# Patient Record
Sex: MALE | Race: BLACK OR AFRICAN AMERICAN | NOT HISPANIC OR LATINO | Employment: FULL TIME | URBAN - METROPOLITAN AREA
[De-identification: names, ages, dates, MRNs, and addresses within clinical notes are randomized per-mention and may not be internally consistent; named-entity substitution may affect disease eponyms.]

---

## 2019-12-05 ENCOUNTER — HOSPITAL ENCOUNTER (EMERGENCY)
Facility: HOSPITAL | Age: 49
Discharge: HOME | End: 2019-12-05
Attending: EMERGENCY MEDICINE
Payer: MEDICAID

## 2019-12-05 VITALS
OXYGEN SATURATION: 98 % | DIASTOLIC BLOOD PRESSURE: 82 MMHG | TEMPERATURE: 99 F | HEART RATE: 80 BPM | RESPIRATION RATE: 18 BRPM | SYSTOLIC BLOOD PRESSURE: 143 MMHG | BODY MASS INDEX: 27.8 KG/M2 | WEIGHT: 216.5 LBS

## 2019-12-05 DIAGNOSIS — R10.9 LEFT FLANK PAIN: Primary | ICD-10-CM

## 2019-12-05 DIAGNOSIS — N30.01 ACUTE CYSTITIS WITH HEMATURIA: ICD-10-CM

## 2019-12-05 LAB
ALBUMIN SERPL BCP-MCNC: 3.9 G/DL (ref 3.5–5.2)
ALP SERPL-CCNC: 89 U/L (ref 55–135)
ALT SERPL W/O P-5'-P-CCNC: 36 U/L (ref 10–44)
ANION GAP SERPL CALC-SCNC: 10 MMOL/L (ref 8–16)
AST SERPL-CCNC: 21 U/L (ref 10–40)
BACTERIA #/AREA URNS AUTO: ABNORMAL /HPF
BASOPHILS # BLD AUTO: 0.02 K/UL (ref 0–0.2)
BASOPHILS NFR BLD: 0.1 % (ref 0–1.9)
BILIRUB SERPL-MCNC: 0.5 MG/DL (ref 0.1–1)
BILIRUB UR QL STRIP: NEGATIVE
BUN SERPL-MCNC: 20 MG/DL (ref 6–20)
CALCIUM SERPL-MCNC: 9.6 MG/DL (ref 8.7–10.5)
CHLORIDE SERPL-SCNC: 103 MMOL/L (ref 95–110)
CLARITY UR REFRACT.AUTO: ABNORMAL
CO2 SERPL-SCNC: 25 MMOL/L (ref 23–29)
COLOR UR AUTO: YELLOW
CREAT SERPL-MCNC: 1.6 MG/DL (ref 0.5–1.4)
DIFFERENTIAL METHOD: ABNORMAL
EOSINOPHIL # BLD AUTO: 0 K/UL (ref 0–0.5)
EOSINOPHIL NFR BLD: 0.2 % (ref 0–8)
ERYTHROCYTE [DISTWIDTH] IN BLOOD BY AUTOMATED COUNT: 14 % (ref 11.5–14.5)
EST. GFR  (AFRICAN AMERICAN): 56.8 ML/MIN/1.73 M^2
EST. GFR  (NON AFRICAN AMERICAN): 49.2 ML/MIN/1.73 M^2
GLUCOSE SERPL-MCNC: 136 MG/DL (ref 70–110)
GLUCOSE UR QL STRIP: NEGATIVE
HCT VFR BLD AUTO: 42 % (ref 40–54)
HGB BLD-MCNC: 13.3 G/DL (ref 14–18)
HGB UR QL STRIP: ABNORMAL
HYALINE CASTS UR QL AUTO: 0 /LPF
IMM GRANULOCYTES # BLD AUTO: 0.06 K/UL (ref 0–0.04)
IMM GRANULOCYTES NFR BLD AUTO: 0.4 % (ref 0–0.5)
KETONES UR QL STRIP: NEGATIVE
LEUKOCYTE ESTERASE UR QL STRIP: ABNORMAL
LYMPHOCYTES # BLD AUTO: 1.9 K/UL (ref 1–4.8)
LYMPHOCYTES NFR BLD: 11.9 % (ref 18–48)
MCH RBC QN AUTO: 26.9 PG (ref 27–31)
MCHC RBC AUTO-ENTMCNC: 31.7 G/DL (ref 32–36)
MCV RBC AUTO: 85 FL (ref 82–98)
MICROSCOPIC COMMENT: ABNORMAL
MONOCYTES # BLD AUTO: 1.4 K/UL (ref 0.3–1)
MONOCYTES NFR BLD: 8.8 % (ref 4–15)
NEUTROPHILS # BLD AUTO: 12.6 K/UL (ref 1.8–7.7)
NEUTROPHILS NFR BLD: 78.6 % (ref 38–73)
NITRITE UR QL STRIP: NEGATIVE
NRBC BLD-RTO: 0 /100 WBC
PH UR STRIP: 6 [PH] (ref 5–8)
PLATELET # BLD AUTO: 208 K/UL (ref 150–350)
PMV BLD AUTO: 10.4 FL (ref 9.2–12.9)
POTASSIUM SERPL-SCNC: 3.8 MMOL/L (ref 3.5–5.1)
PROT SERPL-MCNC: 7.4 G/DL (ref 6–8.4)
PROT UR QL STRIP: ABNORMAL
RBC # BLD AUTO: 4.95 M/UL (ref 4.6–6.2)
RBC #/AREA URNS AUTO: >100 /HPF (ref 0–4)
SODIUM SERPL-SCNC: 138 MMOL/L (ref 136–145)
SP GR UR STRIP: 1.02 (ref 1–1.03)
SQUAMOUS #/AREA URNS AUTO: 4 /HPF
URN SPEC COLLECT METH UR: ABNORMAL
UROBILINOGEN UR STRIP-ACNC: NEGATIVE EU/DL
WBC # BLD AUTO: 16.02 K/UL (ref 3.9–12.7)
WBC #/AREA URNS AUTO: >100 /HPF (ref 0–5)

## 2019-12-05 PROCEDURE — 99284 EMERGENCY DEPT VISIT MOD MDM: CPT | Mod: 25,ER

## 2019-12-05 PROCEDURE — 87077 CULTURE AEROBIC IDENTIFY: CPT

## 2019-12-05 PROCEDURE — 87186 SC STD MICRODIL/AGAR DIL: CPT

## 2019-12-05 PROCEDURE — 96374 THER/PROPH/DIAG INJ IV PUSH: CPT | Mod: ER

## 2019-12-05 PROCEDURE — 81000 URINALYSIS NONAUTO W/SCOPE: CPT | Mod: ER

## 2019-12-05 PROCEDURE — 87086 URINE CULTURE/COLONY COUNT: CPT

## 2019-12-05 PROCEDURE — 63600175 PHARM REV CODE 636 W HCPCS: Mod: ER | Performed by: EMERGENCY MEDICINE

## 2019-12-05 PROCEDURE — 85025 COMPLETE CBC W/AUTO DIFF WBC: CPT | Mod: ER

## 2019-12-05 PROCEDURE — 87088 URINE BACTERIA CULTURE: CPT

## 2019-12-05 PROCEDURE — 80053 COMPREHEN METABOLIC PANEL: CPT | Mod: ER

## 2019-12-05 RX ORDER — CIPROFLOXACIN 500 MG/1
500 TABLET ORAL 2 TIMES DAILY
Qty: 14 TABLET | Refills: 0 | Status: SHIPPED | OUTPATIENT
Start: 2019-12-05 | End: 2019-12-12

## 2019-12-05 RX ORDER — KETOROLAC TROMETHAMINE 30 MG/ML
15 INJECTION, SOLUTION INTRAMUSCULAR; INTRAVENOUS
Status: COMPLETED | OUTPATIENT
Start: 2019-12-05 | End: 2019-12-05

## 2019-12-05 RX ADMIN — KETOROLAC TROMETHAMINE 15 MG: 30 INJECTION, SOLUTION INTRAMUSCULAR; INTRAVENOUS at 10:12

## 2019-12-05 NOTE — ED PROVIDER NOTES
Encounter Date: 12/5/2019       History     Chief Complaint   Patient presents with    Flank Pain     left flank pain intermittent, brown urine today     The history is provided by the patient.   Flank Pain   This is a new problem. The current episode started yesterday. The problem occurs constantly. The problem has not changed since onset.Pertinent negatives include no chest pain, no abdominal pain, no headaches and no shortness of breath. Nothing aggravates the symptoms. Nothing relieves the symptoms.     Review of patient's allergies indicates:  No Known Allergies  Past Medical History:   Diagnosis Date    Diabetes mellitus     High cholesterol     Kidney stones      Past Surgical History:   Procedure Laterality Date    NO PAST SURGERIES       Family History   Problem Relation Age of Onset    Diabetes Mother     Cancer Father      Social History     Tobacco Use    Smoking status: Never Smoker    Smokeless tobacco: Never Used   Substance Use Topics    Alcohol use: No    Drug use: No     Review of Systems   Constitutional: Negative for fever.   HENT: Negative for sore throat.    Respiratory: Negative for shortness of breath.    Cardiovascular: Negative for chest pain.   Gastrointestinal: Negative for abdominal pain and nausea.   Genitourinary: Positive for flank pain. Negative for dysuria.   Musculoskeletal: Negative for back pain.   Skin: Negative for rash.   Neurological: Negative for weakness and headaches.   Hematological: Does not bruise/bleed easily.       Physical Exam     Initial Vitals [12/05/19 1009]   BP Pulse Resp Temp SpO2   (!) 143/82 89 16 98.6 °F (37 °C) 98 %      MAP       --         Physical Exam    Nursing note and vitals reviewed.  Constitutional: He appears well-developed and well-nourished. No distress.   HENT:   Head: Normocephalic and atraumatic.   Mouth/Throat: Oropharynx is clear and moist.   Eyes: Conjunctivae and EOM are normal. Pupils are equal, round, and reactive to light.    Neck: Normal range of motion. Neck supple.   Cardiovascular: Normal rate, regular rhythm and normal heart sounds. Exam reveals no gallop and no friction rub.    No murmur heard.  Pulmonary/Chest: Breath sounds normal. No respiratory distress. He has no wheezes. He has no rhonchi. He has no rales.   Abdominal: Soft. Bowel sounds are normal. He exhibits no distension and no mass. There is no tenderness. There is no rebound and no guarding.   Musculoskeletal: Normal range of motion. He exhibits no edema.   Neurological: He is alert and oriented to person, place, and time. He has normal strength.   Skin: Skin is warm and dry. No rash noted.   Psychiatric: He has a normal mood and affect. Thought content normal.         ED Course   Procedures  Labs Reviewed   URINALYSIS, REFLEX TO URINE CULTURE - Abnormal; Notable for the following components:       Result Value    Appearance, UA Cloudy (*)     Protein, UA 1+ (*)     Occult Blood UA 3+ (*)     Leukocytes, UA 2+ (*)     All other components within normal limits    Narrative:     Preferred Collection Type->Urine, Clean Catch   CBC W/ AUTO DIFFERENTIAL - Abnormal; Notable for the following components:    WBC 16.02 (*)     Hemoglobin 13.3 (*)     Mean Corpuscular Hemoglobin 26.9 (*)     Mean Corpuscular Hemoglobin Conc 31.7 (*)     Gran # (ANC) 12.6 (*)     Immature Grans (Abs) 0.06 (*)     Mono # 1.4 (*)     Gran% 78.6 (*)     Lymph% 11.9 (*)     All other components within normal limits   COMPREHENSIVE METABOLIC PANEL - Abnormal; Notable for the following components:    Glucose 136 (*)     Creatinine 1.6 (*)     eGFR if  56.8 (*)     eGFR if non  49.2 (*)     All other components within normal limits   URINALYSIS MICROSCOPIC - Abnormal; Notable for the following components:    RBC, UA >100 (*)     WBC, UA >100 (*)     Bacteria Moderate (*)     All other components within normal limits    Narrative:     Preferred Collection Type->Urine,  Clean Catch   CULTURE, URINE   URINALYSIS, REFLEX TO URINE CULTURE          Imaging Results          CT Renal Stone Study ABD Pelvis WO (Final result)  Result time 12/05/19 10:48:32    Final result by Mundo Carlson MD (12/05/19 10:48:32)                 Impression:      Bilateral nonobstructing stones are seen within the kidneys.  No evidence of obstructive uropathy.    Hepatic steatosis.    All CT scans at this facility use dose modulation, iterative reconstruction and/or weight based dosing when appropriate to reduce radiation dose to as low as reasonably achievable.      Electronically signed by: Mundo Carlson MD  Date:    12/05/2019  Time:    10:48             Narrative:    EXAMINATION:  CT RENAL STONE STUDY ABD PELVIS WO    CLINICAL HISTORY:  Flank pain, stone disease suspected;    TECHNIQUE:  Routine 5 mm non-contrast images of the abdomen and pelvis were done.  Sagittal and coronal reformats were also submitted for interpretation.    COMPARISON:  10/20/2015    FINDINGS:  The lung bases are unremarkable.  There is no pleural fluid present.  The visualized portions of the heart appear normal.    Mild hepatomegaly with decreased attenuation consistent with hepatic steatosis.  No focal liver lesions and no focal hepatic abnormality.  The gallbladder shows no evidence of stones or pericholecystic fluid.  There is no intra-or extrahepatic biliary ductal dilatation.    The spleen, pancreas, and adrenal glands are unremarkable.    The kidneys are normal in size and location.  10 x 6 mm stone lower pole left kidney.  Punctate nonobstructing stone lower pole right kidney.  No evidence of hydronephrosis.  There is no evidence of hydronephrosis. Bladder is collapsed which limits evaluation..    Stomach is unremarkable.   The visualized loops of small bowel show no evidence of obstruction or inflammation. Large bowel demonstrates mild constipation.  Left colon is collapsed which limits evaluation for colitis.  No  significant inflammatory changes are noted.  Appendix is not seen.  There is no ascites, free fluid, or intraperitoneal free air.    There is no evidence of lymph node enlargement in the abdomen or pelvis.  Small fat containing umbilical hernia.    The abdominal aorta is normal in course and caliber without significant atherosclerotic calcifications.    When viewed with bone windows the osseous structures are unremarkable.    The extraperitoneal soft tissues are unremarkable.                                     ED Vital Signs:  Vitals:    12/05/19 1009 12/05/19 1106   BP: (!) 143/82    Pulse: 89 80   Resp: 16 18   Temp: 98.6 °F (37 °C)    TempSrc: Oral    SpO2: 98%    Weight: 98.2 kg (216 lb 7.9 oz)          Abnormal Lab Results:  Labs Reviewed   URINALYSIS, REFLEX TO URINE CULTURE - Abnormal; Notable for the following components:       Result Value    Appearance, UA Cloudy (*)     Protein, UA 1+ (*)     Occult Blood UA 3+ (*)     Leukocytes, UA 2+ (*)     All other components within normal limits    Narrative:     Preferred Collection Type->Urine, Clean Catch   CBC W/ AUTO DIFFERENTIAL - Abnormal; Notable for the following components:    WBC 16.02 (*)     Hemoglobin 13.3 (*)     Mean Corpuscular Hemoglobin 26.9 (*)     Mean Corpuscular Hemoglobin Conc 31.7 (*)     Gran # (ANC) 12.6 (*)     Immature Grans (Abs) 0.06 (*)     Mono # 1.4 (*)     Gran% 78.6 (*)     Lymph% 11.9 (*)     All other components within normal limits   COMPREHENSIVE METABOLIC PANEL - Abnormal; Notable for the following components:    Glucose 136 (*)     Creatinine 1.6 (*)     eGFR if  56.8 (*)     eGFR if non  49.2 (*)     All other components within normal limits   URINALYSIS MICROSCOPIC - Abnormal; Notable for the following components:    RBC, UA >100 (*)     WBC, UA >100 (*)     Bacteria Moderate (*)     All other components within normal limits    Narrative:     Preferred Collection Type->Urine, Clean Catch    CULTURE, URINE   URINALYSIS, REFLEX TO URINE CULTURE          All Lab Results:  Results for orders placed or performed during the hospital encounter of 12/05/19   Urinalysis, Reflex to Urine Culture Urine, Clean Catch   Result Value Ref Range    Specimen UA Urine, Clean Catch     Color, UA Yellow Yellow, Straw, Aleyda    Appearance, UA Cloudy (A) Clear    pH, UA 6.0 5.0 - 8.0    Specific Gravity, UA 1.025 1.005 - 1.030    Protein, UA 1+ (A) Negative    Glucose, UA Negative Negative    Ketones, UA Negative Negative    Bilirubin (UA) Negative Negative    Occult Blood UA 3+ (A) Negative    Nitrite, UA Negative Negative    Urobilinogen, UA Negative <2.0 EU/dL    Leukocytes, UA 2+ (A) Negative   CBC auto differential   Result Value Ref Range    WBC 16.02 (H) 3.90 - 12.70 K/uL    RBC 4.95 4.60 - 6.20 M/uL    Hemoglobin 13.3 (L) 14.0 - 18.0 g/dL    Hematocrit 42.0 40.0 - 54.0 %    Mean Corpuscular Volume 85 82 - 98 fL    Mean Corpuscular Hemoglobin 26.9 (L) 27.0 - 31.0 pg    Mean Corpuscular Hemoglobin Conc 31.7 (L) 32.0 - 36.0 g/dL    RDW 14.0 11.5 - 14.5 %    Platelets 208 150 - 350 K/uL    MPV 10.4 9.2 - 12.9 fL    Immature Granulocytes 0.4 0.0 - 0.5 %    Gran # (ANC) 12.6 (H) 1.8 - 7.7 K/uL    Immature Grans (Abs) 0.06 (H) 0.00 - 0.04 K/uL    Lymph # 1.9 1.0 - 4.8 K/uL    Mono # 1.4 (H) 0.3 - 1.0 K/uL    Eos # 0.0 0.0 - 0.5 K/uL    Baso # 0.02 0.00 - 0.20 K/uL    nRBC 0 0 /100 WBC    Gran% 78.6 (H) 38.0 - 73.0 %    Lymph% 11.9 (L) 18.0 - 48.0 %    Mono% 8.8 4.0 - 15.0 %    Eosinophil% 0.2 0.0 - 8.0 %    Basophil% 0.1 0.0 - 1.9 %    Differential Method Automated    Comprehensive metabolic panel   Result Value Ref Range    Sodium 138 136 - 145 mmol/L    Potassium 3.8 3.5 - 5.1 mmol/L    Chloride 103 95 - 110 mmol/L    CO2 25 23 - 29 mmol/L    Glucose 136 (H) 70 - 110 mg/dL    BUN, Bld 20 6 - 20 mg/dL    Creatinine 1.6 (H) 0.5 - 1.4 mg/dL    Calcium 9.6 8.7 - 10.5 mg/dL    Total Protein 7.4 6.0 - 8.4 g/dL    Albumin 3.9  3.5 - 5.2 g/dL    Total Bilirubin 0.5 0.1 - 1.0 mg/dL    Alkaline Phosphatase 89 55 - 135 U/L    AST 21 10 - 40 U/L    ALT 36 10 - 44 U/L    Anion Gap 10 8 - 16 mmol/L    eGFR if African American 56.8 (A) >60 mL/min/1.73 m^2    eGFR if non  49.2 (A) >60 mL/min/1.73 m^2   Urinalysis Microscopic   Result Value Ref Range    RBC, UA >100 (H) 0 - 4 /hpf    WBC, UA >100 (H) 0 - 5 /hpf    Bacteria Moderate (A) None-Occ /hpf    Squam Epithel, UA 4 /hpf    Hyaline Casts, UA 0 0-1/lpf /lpf    Microscopic Comment SEE COMMENT            Imaging Results:  Imaging Results          CT Renal Stone Study ABD Pelvis WO (Final result)  Result time 12/05/19 10:48:32    Final result by Mundo Carlson MD (12/05/19 10:48:32)                 Impression:      Bilateral nonobstructing stones are seen within the kidneys.  No evidence of obstructive uropathy.    Hepatic steatosis.    All CT scans at this facility use dose modulation, iterative reconstruction and/or weight based dosing when appropriate to reduce radiation dose to as low as reasonably achievable.      Electronically signed by: Mundo Carlson MD  Date:    12/05/2019  Time:    10:48             Narrative:    EXAMINATION:  CT RENAL STONE STUDY ABD PELVIS WO    CLINICAL HISTORY:  Flank pain, stone disease suspected;    TECHNIQUE:  Routine 5 mm non-contrast images of the abdomen and pelvis were done.  Sagittal and coronal reformats were also submitted for interpretation.    COMPARISON:  10/20/2015    FINDINGS:  The lung bases are unremarkable.  There is no pleural fluid present.  The visualized portions of the heart appear normal.    Mild hepatomegaly with decreased attenuation consistent with hepatic steatosis.  No focal liver lesions and no focal hepatic abnormality.  The gallbladder shows no evidence of stones or pericholecystic fluid.  There is no intra-or extrahepatic biliary ductal dilatation.    The spleen, pancreas, and adrenal glands are unremarkable.    The  kidneys are normal in size and location.  10 x 6 mm stone lower pole left kidney.  Punctate nonobstructing stone lower pole right kidney.  No evidence of hydronephrosis.  There is no evidence of hydronephrosis. Bladder is collapsed which limits evaluation..    Stomach is unremarkable.   The visualized loops of small bowel show no evidence of obstruction or inflammation. Large bowel demonstrates mild constipation.  Left colon is collapsed which limits evaluation for colitis.  No significant inflammatory changes are noted.  Appendix is not seen.  There is no ascites, free fluid, or intraperitoneal free air.    There is no evidence of lymph node enlargement in the abdomen or pelvis.  Small fat containing umbilical hernia.    The abdominal aorta is normal in course and caliber without significant atherosclerotic calcifications.    When viewed with bone windows the osseous structures are unremarkable.    The extraperitoneal soft tissues are unremarkable.                                   The Emergency Provider reviewed the vital signs and test results, which are outlined above.    ED Discussions:  11:49 AM: Reassessed pt at this time.  Pt states his condition has improved at this time. Discussed with pt all pertinent ED information and results. Discussed pt dx of UTI and plan of tx. Gave pt all f/u and return to the ED instructions. All questions and concerns were addressed at this time. Pt expresses understanding of information and instructions, and is comfortable with plan to discharge. Pt is stable for discharge.                                         Clinical Impression:       ICD-10-CM ICD-9-CM   1. Left flank pain R10.9 789.09   2. Acute cystitis with hematuria N30.01 595.0           Disposition:   Disposition: Discharged  Condition: Stable                     Rudolph Meneses MD  12/05/19 6010

## 2019-12-08 LAB — BACTERIA UR CULT: ABNORMAL

## 2019-12-09 ENCOUNTER — TELEPHONE (OUTPATIENT)
Dept: EMERGENCY MEDICINE | Facility: HOSPITAL | Age: 49
End: 2019-12-09

## 2019-12-10 ENCOUNTER — TELEPHONE (OUTPATIENT)
Dept: EMERGENCY MEDICINE | Facility: HOSPITAL | Age: 49
End: 2019-12-10

## 2020-12-16 ENCOUNTER — HOSPITAL ENCOUNTER (EMERGENCY)
Facility: HOSPITAL | Age: 50
Discharge: HOME | End: 2020-12-16
Attending: EMERGENCY MEDICINE
Payer: MEDICAID

## 2020-12-16 VITALS
DIASTOLIC BLOOD PRESSURE: 87 MMHG | TEMPERATURE: 98 F | BODY MASS INDEX: 26.42 KG/M2 | OXYGEN SATURATION: 96 % | WEIGHT: 205.81 LBS | HEART RATE: 100 BPM | SYSTOLIC BLOOD PRESSURE: 143 MMHG | RESPIRATION RATE: 16 BRPM

## 2020-12-16 DIAGNOSIS — U07.1 COVID-19 VIRUS INFECTION: Primary | ICD-10-CM

## 2020-12-16 DIAGNOSIS — U07.1 COVID-19 VIRUS DETECTED: ICD-10-CM

## 2020-12-16 LAB
CTP QC/QA: YES
CTP QC/QA: YES
POC MOLECULAR INFLUENZA A AGN: NEGATIVE
POC MOLECULAR INFLUENZA B AGN: NEGATIVE
SARS-COV-2 RDRP RESP QL NAA+PROBE: POSITIVE

## 2020-12-16 PROCEDURE — U0002 COVID-19 LAB TEST NON-CDC: HCPCS | Mod: ER | Performed by: EMERGENCY MEDICINE

## 2020-12-16 PROCEDURE — 99282 EMERGENCY DEPT VISIT SF MDM: CPT | Mod: 25,ER

## 2020-12-16 RX ORDER — INSULIN GLARGINE 100 [IU]/ML
45 INJECTION, SOLUTION SUBCUTANEOUS
COMMUNITY
Start: 2020-09-28

## 2020-12-16 RX ORDER — INSULIN ASPART 100 [IU]/ML
23 INJECTION, SOLUTION INTRAVENOUS; SUBCUTANEOUS
COMMUNITY
Start: 2020-03-25

## 2020-12-16 RX ORDER — KETOROLAC TROMETHAMINE 30 MG/ML
30 INJECTION, SOLUTION INTRAMUSCULAR; INTRAVENOUS
Status: DISCONTINUED | OUTPATIENT
Start: 2020-12-16 | End: 2020-12-16

## 2020-12-16 RX ORDER — LISINOPRIL 20 MG/1
20 TABLET ORAL
COMMUNITY
Start: 2020-03-19

## 2020-12-16 RX ORDER — PROCHLORPERAZINE EDISYLATE 5 MG/ML
10 INJECTION INTRAMUSCULAR; INTRAVENOUS
Status: DISCONTINUED | OUTPATIENT
Start: 2020-12-16 | End: 2020-12-16

## 2020-12-16 NOTE — ED PROVIDER NOTES
"Encounter Date: 12/16/2020       History     Chief Complaint   Patient presents with    Generalized Body Aches     been taking OTC medication, feeling bad since sunday, "it comes and goes and my back hurts"     He has mild symptoms for about 5 days, has still been going to work but left today and will need a note.  He reports mild headache, body aches, waxing and waning low back discomfort, mild diarrhea, mild change in sense of taste.  No change in sense of smell.  No coronavirus exposure that he knows of, reports he has been following recommended precautions.  No dyspnea, chest pain, abdominal pain, urinary symptoms, or other complaints.    The history is provided by the patient. No  was used.     Review of patient's allergies indicates:  No Known Allergies  Past Medical History:   Diagnosis Date    Diabetes mellitus     High cholesterol     Kidney stones      Past Surgical History:   Procedure Laterality Date    NO PAST SURGERIES       Family History   Problem Relation Age of Onset    Diabetes Mother     Cancer Father      Social History     Tobacco Use    Smoking status: Never Smoker    Smokeless tobacco: Never Used   Substance Use Topics    Alcohol use: No    Drug use: No     Review of Systems   Constitutional: Negative for chills and fever.   HENT: Negative for congestion, facial swelling, nosebleeds and sinus pressure.         Mild change in sense of taste   Eyes: Negative for pain and redness.   Respiratory: Negative for chest tightness, shortness of breath and wheezing.    Cardiovascular: Negative for chest pain, palpitations and leg swelling.   Gastrointestinal: Positive for diarrhea. Negative for abdominal distention, abdominal pain, nausea and vomiting.   Endocrine: Negative for cold intolerance, polydipsia and polyphagia.   Genitourinary: Negative for difficulty urinating, dysuria, frequency and hematuria.   Musculoskeletal: Positive for back pain. Negative for " arthralgias, myalgias and neck pain.   Skin: Negative for color change and rash.   Neurological: Positive for headaches. Negative for dizziness, weakness and numbness.   Hematological: Negative for adenopathy. Does not bruise/bleed easily.   Psychiatric/Behavioral: Negative for agitation and behavioral problems.   All other systems reviewed and are negative.      Physical Exam     Initial Vitals [12/16/20 0802]   BP Pulse Resp Temp SpO2   (!) 143/87 100 16 98.1 °F (36.7 °C) 96 %      MAP       --         Physical Exam    Nursing note and vitals reviewed.  Constitutional: He appears well-developed and well-nourished. He is not diaphoretic. No distress.   HENT:   Head: Normocephalic and atraumatic.   Mouth/Throat: Oropharynx is clear and moist. No oropharyngeal exudate.   Eyes: Conjunctivae and EOM are normal. Pupils are equal, round, and reactive to light. Right eye exhibits no discharge. Left eye exhibits no discharge. No scleral icterus.   Neck: Normal range of motion. Neck supple. No thyromegaly present. No tracheal deviation present. No JVD present.   Cardiovascular: Normal rate, regular rhythm and normal heart sounds. Exam reveals no gallop and no friction rub.    No murmur heard.  Pulmonary/Chest: Breath sounds normal. No respiratory distress. He has no wheezes. He has no rhonchi. He has no rales. He exhibits no tenderness.   Abdominal: Soft. Bowel sounds are normal. He exhibits no distension and no mass. There is no abdominal tenderness. There is no rebound and no guarding.   Musculoskeletal: Normal range of motion. No tenderness or edema.   Lymphadenopathy:     He has no cervical adenopathy.   Neurological: He is alert and oriented to person, place, and time. He has normal strength. No cranial nerve deficit.   Skin: Skin is warm and dry. No rash noted. No erythema.   Psychiatric: He has a normal mood and affect. His behavior is normal. Judgment and thought content normal.         ED Course   Procedures  Labs  Reviewed   SARS-COV-2 RDRP GENE - Abnormal; Notable for the following components:       Result Value    POC Rapid COVID Positive (*)     All other components within normal limits   POCT INFLUENZA A/B MOLECULAR          Imaging Results    None           8:44 AM Reviewed results, discussed all relevant information regarding acute coronavirus infection, stable for management, isolation, and expectant treatment with symptomatic over-the-counter medications.                                 Clinical Impression:     ICD-10-CM ICD-9-CM   1. COVID-19 virus infection  U07.1 079.89                        1. COVID-19 virus infection             Disposition:   Disposition: Discharged  Condition: Stable     ED Disposition Condition    Discharge Stable        ED Prescriptions     None        Follow-up Information     Follow up With Specialties Details Why Contact Info    [facility] Emergency [facility]  As needed [id number]  [facility] [id number] [phone]                                       Freddie Baker MD  12/16/20 0808

## 2020-12-16 NOTE — Clinical Note
"Iftikhar "Marianne Burns was seen and treated in our emergency department on 12/16/2020.     COVID-19 is present in our communities across the state. There is limited testing for COVID at this time, so not all patients can be tested. In this situation, your employee meets the following criteria:    Iftikhar Burns has met the criteria for COVID-19 testing and has a POSITIVE result. He can return to work once they are asymptomatic for 72 hours without the use of fever reducing medications AND at least ten days from the first positive result.     If you have any questions or concerns, or if I can be of further assistance, please do not hesitate to contact me.    Sincerely,             Freddie Baker MD"

## 2021-09-16 ENCOUNTER — HOSPITAL ENCOUNTER (EMERGENCY)
Facility: HOSPITAL | Age: 51
Discharge: HOME | End: 2021-09-16
Attending: EMERGENCY MEDICINE
Payer: MEDICAID

## 2021-09-16 VITALS
TEMPERATURE: 98 F | SYSTOLIC BLOOD PRESSURE: 115 MMHG | HEART RATE: 99 BPM | HEIGHT: 74 IN | RESPIRATION RATE: 20 BRPM | WEIGHT: 222.56 LBS | DIASTOLIC BLOOD PRESSURE: 83 MMHG | OXYGEN SATURATION: 96 % | BODY MASS INDEX: 28.56 KG/M2

## 2021-09-16 DIAGNOSIS — N30.01 ACUTE CYSTITIS WITH HEMATURIA: Primary | ICD-10-CM

## 2021-09-16 LAB
BACTERIA #/AREA URNS AUTO: ABNORMAL /HPF
BILIRUB UR QL STRIP: NEGATIVE
CLARITY UR REFRACT.AUTO: ABNORMAL
COLOR UR AUTO: YELLOW
GLUCOSE UR QL STRIP: NEGATIVE
HGB UR QL STRIP: ABNORMAL
HYALINE CASTS UR QL AUTO: 0 /LPF
KETONES UR QL STRIP: NEGATIVE
LEUKOCYTE ESTERASE UR QL STRIP: ABNORMAL
MICROSCOPIC COMMENT: ABNORMAL
NITRITE UR QL STRIP: POSITIVE
PH UR STRIP: 6 [PH] (ref 5–8)
PROT UR QL STRIP: ABNORMAL
RBC #/AREA URNS AUTO: 20 /HPF (ref 0–4)
SP GR UR STRIP: >=1.03 (ref 1–1.03)
URN SPEC COLLECT METH UR: ABNORMAL
UROBILINOGEN UR STRIP-ACNC: NEGATIVE EU/DL
WBC #/AREA URNS AUTO: 40 /HPF (ref 0–5)
WBC CLUMPS UR QL AUTO: ABNORMAL

## 2021-09-16 PROCEDURE — 87186 SC STD MICRODIL/AGAR DIL: CPT | Performed by: EMERGENCY MEDICINE

## 2021-09-16 PROCEDURE — 81000 URINALYSIS NONAUTO W/SCOPE: CPT | Mod: ER | Performed by: EMERGENCY MEDICINE

## 2021-09-16 PROCEDURE — 87088 URINE BACTERIA CULTURE: CPT | Performed by: EMERGENCY MEDICINE

## 2021-09-16 PROCEDURE — 87077 CULTURE AEROBIC IDENTIFY: CPT | Performed by: EMERGENCY MEDICINE

## 2021-09-16 PROCEDURE — 87086 URINE CULTURE/COLONY COUNT: CPT | Performed by: EMERGENCY MEDICINE

## 2021-09-16 PROCEDURE — 99284 EMERGENCY DEPT VISIT MOD MDM: CPT | Mod: ER

## 2021-09-16 RX ORDER — CEFUROXIME AXETIL 500 MG/1
500 TABLET ORAL 2 TIMES DAILY
Qty: 20 TABLET | Refills: 0 | Status: SHIPPED | OUTPATIENT
Start: 2021-09-16 | End: 2021-09-26

## 2021-09-20 LAB — BACTERIA UR CULT: ABNORMAL

## 2021-10-05 ENCOUNTER — HOSPITAL ENCOUNTER (EMERGENCY)
Facility: HOSPITAL | Age: 51
Discharge: HOME | End: 2021-10-05
Attending: EMERGENCY MEDICINE
Payer: MEDICAID

## 2021-10-05 VITALS
OXYGEN SATURATION: 96 % | BODY MASS INDEX: 28.49 KG/M2 | SYSTOLIC BLOOD PRESSURE: 150 MMHG | RESPIRATION RATE: 16 BRPM | TEMPERATURE: 98 F | HEART RATE: 87 BPM | WEIGHT: 221.88 LBS | DIASTOLIC BLOOD PRESSURE: 85 MMHG

## 2021-10-05 DIAGNOSIS — N28.9 RENAL DYSFUNCTION: ICD-10-CM

## 2021-10-05 DIAGNOSIS — R73.9 HYPERGLYCEMIA: ICD-10-CM

## 2021-10-05 DIAGNOSIS — N45.1 RIGHT EPIDIDYMITIS: Primary | ICD-10-CM

## 2021-10-05 LAB
ALBUMIN SERPL BCP-MCNC: 4 G/DL (ref 3.5–5.2)
ALP SERPL-CCNC: 104 U/L (ref 55–135)
ALT SERPL W/O P-5'-P-CCNC: 39 U/L (ref 10–44)
ANION GAP SERPL CALC-SCNC: 9 MMOL/L (ref 8–16)
AST SERPL-CCNC: 26 U/L (ref 10–40)
BACTERIA #/AREA URNS AUTO: ABNORMAL /HPF
BASOPHILS # BLD AUTO: 0.03 K/UL (ref 0–0.2)
BASOPHILS NFR BLD: 0.2 % (ref 0–1.9)
BILIRUB SERPL-MCNC: 0.3 MG/DL (ref 0.1–1)
BILIRUB UR QL STRIP: NEGATIVE
BUN SERPL-MCNC: 16 MG/DL (ref 6–20)
CALCIUM SERPL-MCNC: 9.1 MG/DL (ref 8.7–10.5)
CHLORIDE SERPL-SCNC: 102 MMOL/L (ref 95–110)
CLARITY UR REFRACT.AUTO: CLEAR
CO2 SERPL-SCNC: 25 MMOL/L (ref 23–29)
COLOR UR AUTO: YELLOW
CREAT SERPL-MCNC: 1.5 MG/DL (ref 0.5–1.4)
DIFFERENTIAL METHOD: ABNORMAL
EOSINOPHIL # BLD AUTO: 0.1 K/UL (ref 0–0.5)
EOSINOPHIL NFR BLD: 0.7 % (ref 0–8)
ERYTHROCYTE [DISTWIDTH] IN BLOOD BY AUTOMATED COUNT: 14.6 % (ref 11.5–14.5)
EST. GFR  (AFRICAN AMERICAN): >60 ML/MIN/1.73 M^2
EST. GFR  (NON AFRICAN AMERICAN): 52.8 ML/MIN/1.73 M^2
GLUCOSE SERPL-MCNC: 221 MG/DL (ref 70–110)
GLUCOSE UR QL STRIP: ABNORMAL
HCT VFR BLD AUTO: 42.9 % (ref 40–54)
HGB BLD-MCNC: 13.5 G/DL (ref 14–18)
HGB UR QL STRIP: ABNORMAL
HYALINE CASTS UR QL AUTO: 0 /LPF
IMM GRANULOCYTES # BLD AUTO: 0.06 K/UL (ref 0–0.04)
IMM GRANULOCYTES NFR BLD AUTO: 0.5 % (ref 0–0.5)
KETONES UR QL STRIP: NEGATIVE
LEUKOCYTE ESTERASE UR QL STRIP: NEGATIVE
LIPASE SERPL-CCNC: 23 U/L (ref 4–60)
LYMPHOCYTES # BLD AUTO: 2 K/UL (ref 1–4.8)
LYMPHOCYTES NFR BLD: 16.1 % (ref 18–48)
MCH RBC QN AUTO: 25.9 PG (ref 27–31)
MCHC RBC AUTO-ENTMCNC: 31.5 G/DL (ref 32–36)
MCV RBC AUTO: 82 FL (ref 82–98)
MICROSCOPIC COMMENT: ABNORMAL
MONOCYTES # BLD AUTO: 0.8 K/UL (ref 0.3–1)
MONOCYTES NFR BLD: 6.5 % (ref 4–15)
NEUTROPHILS # BLD AUTO: 9.2 K/UL (ref 1.8–7.7)
NEUTROPHILS NFR BLD: 76 % (ref 38–73)
NITRITE UR QL STRIP: NEGATIVE
NRBC BLD-RTO: 0 /100 WBC
PH UR STRIP: 6 [PH] (ref 5–8)
PLATELET # BLD AUTO: 253 K/UL (ref 150–450)
PMV BLD AUTO: 9.5 FL (ref 9.2–12.9)
POTASSIUM SERPL-SCNC: 4.2 MMOL/L (ref 3.5–5.1)
PROT SERPL-MCNC: 8.2 G/DL (ref 6–8.4)
PROT UR QL STRIP: ABNORMAL
RBC # BLD AUTO: 5.22 M/UL (ref 4.6–6.2)
RBC #/AREA URNS AUTO: 8 /HPF (ref 0–4)
SODIUM SERPL-SCNC: 136 MMOL/L (ref 136–145)
SP GR UR STRIP: >=1.03 (ref 1–1.03)
URN SPEC COLLECT METH UR: ABNORMAL
UROBILINOGEN UR STRIP-ACNC: NEGATIVE EU/DL
WBC # BLD AUTO: 12.11 K/UL (ref 3.9–12.7)
WBC #/AREA URNS AUTO: 2 /HPF (ref 0–5)

## 2021-10-05 PROCEDURE — 96375 TX/PRO/DX INJ NEW DRUG ADDON: CPT | Mod: ER

## 2021-10-05 PROCEDURE — 25000003 PHARM REV CODE 250: Mod: ER | Performed by: EMERGENCY MEDICINE

## 2021-10-05 PROCEDURE — 63600175 PHARM REV CODE 636 W HCPCS: Mod: ER | Performed by: EMERGENCY MEDICINE

## 2021-10-05 PROCEDURE — 99285 EMERGENCY DEPT VISIT HI MDM: CPT | Mod: 25,ER

## 2021-10-05 PROCEDURE — 85025 COMPLETE CBC W/AUTO DIFF WBC: CPT | Mod: ER | Performed by: EMERGENCY MEDICINE

## 2021-10-05 PROCEDURE — 83690 ASSAY OF LIPASE: CPT | Mod: ER | Performed by: EMERGENCY MEDICINE

## 2021-10-05 PROCEDURE — 96365 THER/PROPH/DIAG IV INF INIT: CPT | Mod: ER

## 2021-10-05 PROCEDURE — 96361 HYDRATE IV INFUSION ADD-ON: CPT | Mod: ER

## 2021-10-05 PROCEDURE — 81000 URINALYSIS NONAUTO W/SCOPE: CPT | Mod: ER | Performed by: EMERGENCY MEDICINE

## 2021-10-05 PROCEDURE — 80053 COMPREHEN METABOLIC PANEL: CPT | Mod: ER | Performed by: EMERGENCY MEDICINE

## 2021-10-05 RX ORDER — AMLODIPINE BESYLATE 5 MG/1
5 TABLET ORAL NIGHTLY
COMMUNITY
Start: 2021-07-10

## 2021-10-05 RX ORDER — DOXYCYCLINE HYCLATE 100 MG
100 TABLET ORAL
Status: COMPLETED | OUTPATIENT
Start: 2021-10-05 | End: 2021-10-05

## 2021-10-05 RX ORDER — DOXYCYCLINE 100 MG/1
100 CAPSULE ORAL 2 TIMES DAILY
Qty: 20 CAPSULE | Refills: 0 | Status: SHIPPED | OUTPATIENT
Start: 2021-10-05 | End: 2021-10-15

## 2021-10-05 RX ORDER — PEN NEEDLE, DIABETIC 29 G X1/2"
NEEDLE, DISPOSABLE MISCELLANEOUS
COMMUNITY
Start: 2021-04-12

## 2021-10-05 RX ORDER — PEN NEEDLE, DIABETIC 31 GX5/16"
NEEDLE, DISPOSABLE MISCELLANEOUS 4 TIMES DAILY
COMMUNITY
Start: 2021-09-05

## 2021-10-05 RX ORDER — KETOROLAC TROMETHAMINE 30 MG/ML
30 INJECTION, SOLUTION INTRAMUSCULAR; INTRAVENOUS
Status: COMPLETED | OUTPATIENT
Start: 2021-10-05 | End: 2021-10-05

## 2021-10-05 RX ADMIN — KETOROLAC TROMETHAMINE 30 MG: 30 INJECTION, SOLUTION INTRAMUSCULAR at 07:10

## 2021-10-05 RX ADMIN — SODIUM CHLORIDE 1000 ML: 0.9 INJECTION, SOLUTION INTRAVENOUS at 07:10

## 2021-10-05 RX ADMIN — DOXYCYCLINE HYCLATE 100 MG: 100 TABLET, COATED ORAL at 09:10

## 2021-10-05 RX ADMIN — CEFTRIAXONE 1 G: 1 INJECTION, POWDER, FOR SOLUTION INTRAMUSCULAR; INTRAVENOUS at 09:10

## 2023-11-24 NOTE — DISCHARGE INSTRUCTIONS
Coronavirus test is positive.  Follow all precautions, take routine medications for symptoms such as Tylenol and Advil as labeled, TheraFlu, etcetera.  No prescriptions needed.  Return for recheck if short of breath.  If able, monitor pulse oximeter at home and return if below 93%.    No return to work until you meet all return to work guidelines as written.  You **do not** get a repeat test at any point, it does not help.           Medication:   Incruse Ellipta 62.5 MCG/ACT inhaler 90 each 1 11/22/2023 --    Sig: INHALE 1 PUFF INTO THE LUNGS DAILY    Sent to pharmacy as: Incruse Ellipta 62.5 MCG/ACT Inhalation Aerosol Powder Breath Activated (umeclidinium bromide)    Class: Eprescribe    E-Prescribing Status: Receipt confirmed by pharmacy (11/22/2023  3:03 PM CST)        Medication refill denied due to duplicate request.

## 2024-09-03 ENCOUNTER — HOSPITAL ENCOUNTER (EMERGENCY)
Facility: HOSPITAL | Age: 54
Discharge: HOME | End: 2024-09-04
Attending: EMERGENCY MEDICINE

## 2024-09-03 DIAGNOSIS — N20.1 URETEROLITHIASIS: Primary | ICD-10-CM

## 2024-09-03 DIAGNOSIS — N20.0 NEPHROLITHIASIS: ICD-10-CM

## 2024-09-03 PROCEDURE — 99285 EMERGENCY DEPT VISIT HI MDM: CPT | Mod: 25,ER

## 2024-09-03 PROCEDURE — 81000 URINALYSIS NONAUTO W/SCOPE: CPT | Mod: ER | Performed by: EMERGENCY MEDICINE

## 2024-09-03 RX ORDER — NALOXONE HCL 0.4 MG/ML
2 VIAL (ML) INJECTION
Status: DISCONTINUED | OUTPATIENT
Start: 2024-09-03 | End: 2024-09-03

## 2024-09-03 NOTE — Clinical Note
"Iftikhar "Iftikhar" Grant was seen and treated in our emergency department on 9/3/2024.  He may return to work on 09/05/2024.       If you have any questions or concerns, please don't hesitate to call.       RN    " Medications/Imaging Studies

## 2024-09-04 VITALS
DIASTOLIC BLOOD PRESSURE: 96 MMHG | BODY MASS INDEX: 28.88 KG/M2 | HEIGHT: 74 IN | HEART RATE: 71 BPM | RESPIRATION RATE: 19 BRPM | TEMPERATURE: 98 F | WEIGHT: 225 LBS | SYSTOLIC BLOOD PRESSURE: 174 MMHG | OXYGEN SATURATION: 97 %

## 2024-09-04 LAB
BACTERIA #/AREA URNS AUTO: ABNORMAL /HPF
BILIRUB UR QL STRIP: NEGATIVE
CLARITY UR REFRACT.AUTO: CLEAR
COLOR UR AUTO: YELLOW
GLUCOSE UR QL STRIP: ABNORMAL
HGB UR QL STRIP: ABNORMAL
KETONES UR QL STRIP: NEGATIVE
LEUKOCYTE ESTERASE UR QL STRIP: NEGATIVE
MICROSCOPIC COMMENT: ABNORMAL
NITRITE UR QL STRIP: POSITIVE
PH UR STRIP: 7 [PH] (ref 5–8)
PROT UR QL STRIP: NEGATIVE
RBC #/AREA URNS AUTO: 6 /HPF (ref 0–4)
SP GR UR STRIP: 1.02 (ref 1–1.03)
URN SPEC COLLECT METH UR: ABNORMAL
UROBILINOGEN UR STRIP-ACNC: NEGATIVE EU/DL
WBC #/AREA URNS AUTO: 4 /HPF (ref 0–5)
WBC CLUMPS UR QL AUTO: ABNORMAL
YEAST UR QL AUTO: ABNORMAL

## 2024-09-04 PROCEDURE — 96374 THER/PROPH/DIAG INJ IV PUSH: CPT | Mod: ER

## 2024-09-04 PROCEDURE — 25000003 PHARM REV CODE 250: Mod: ER | Performed by: EMERGENCY MEDICINE

## 2024-09-04 PROCEDURE — 63600175 PHARM REV CODE 636 W HCPCS: Mod: ER | Performed by: EMERGENCY MEDICINE

## 2024-09-04 RX ORDER — ONDANSETRON 4 MG/1
4 TABLET, ORALLY DISINTEGRATING ORAL EVERY 6 HOURS PRN
Qty: 12 TABLET | Refills: 0 | Status: SHIPPED | OUTPATIENT
Start: 2024-09-04

## 2024-09-04 RX ORDER — KETOROLAC TROMETHAMINE 30 MG/ML
10 INJECTION, SOLUTION INTRAMUSCULAR; INTRAVENOUS
Status: COMPLETED | OUTPATIENT
Start: 2024-09-04 | End: 2024-09-04

## 2024-09-04 RX ORDER — TAMSULOSIN HYDROCHLORIDE 0.4 MG/1
0.4 CAPSULE ORAL DAILY
Qty: 30 CAPSULE | Refills: 0 | Status: SHIPPED | OUTPATIENT
Start: 2024-09-04 | End: 2025-09-04

## 2024-09-04 RX ORDER — KETOROLAC TROMETHAMINE 10 MG/1
10 TABLET, FILM COATED ORAL EVERY 8 HOURS PRN
Qty: 15 TABLET | Refills: 0 | Status: SHIPPED | OUTPATIENT
Start: 2024-09-04

## 2024-09-04 RX ORDER — TAMSULOSIN HYDROCHLORIDE 0.4 MG/1
0.4 CAPSULE ORAL
Status: COMPLETED | OUTPATIENT
Start: 2024-09-04 | End: 2024-09-04

## 2024-09-04 RX ADMIN — TAMSULOSIN HYDROCHLORIDE 0.4 MG: 0.4 CAPSULE ORAL at 12:09

## 2024-09-04 RX ADMIN — KETOROLAC TROMETHAMINE 10 MG: 30 INJECTION, SOLUTION INTRAMUSCULAR at 12:09

## 2024-09-05 NOTE — ED PROVIDER NOTES
ED Provider Note - 9/3/2024    History     Chief Complaint   Patient presents with    Flank Pain     Arrived via [facility] c/o right flank pain. 500cc NS given per EMS     The patient currently presents with concerns of flank pain.  This is localized to the right flank.  This began this PM.  There is associated nausea and vomiting.  Patient denies associated fever/chills.  There is a history of prior kidney stones.  Hematuria has not been seen.        Review of patient's allergies indicates:  No Known Allergies  Past Medical History:   Diagnosis Date    Diabetes mellitus     High cholesterol     Kidney stones      Past Surgical History:   Procedure Laterality Date    NO PAST SURGERIES       Family History   Problem Relation Name Age of Onset    Diabetes Mother      Cancer Father       Social History     Tobacco Use    Smoking status: Never    Smokeless tobacco: Never   Substance Use Topics    Alcohol use: No    Drug use: No     Review of Systems   Constitutional:  Negative for chills and fever.   HENT:  Negative for congestion and sore throat.    Respiratory:  Negative for chest tightness and shortness of breath.    Cardiovascular:  Negative for chest pain and palpitations.   Gastrointestinal:  Positive for nausea and vomiting. Negative for abdominal pain.   Genitourinary:  Positive for flank pain. Negative for difficulty urinating and dysuria.   Skin:  Negative for color change and rash.   Neurological:  Negative for weakness and numbness.   All other systems reviewed and are negative.      Physical Exam     Initial Vitals   BP Pulse Resp Temp SpO2   09/03/24 2302 09/03/24 2302 09/03/24 2313 09/04/24 0010 09/03/24 2302   (!) 185/109 73 19 97.7 °F (36.5 °C) 98 %      MAP       --                Vitals:    09/03/24 2302 09/03/24 2313 09/04/24 0002 09/04/24 0010   BP: (!) 185/109  (!) 187/101    Pulse: 73  75    Resp:  19     Temp:    97.7 °F (36.5 °C)   TempSrc:    Oral   SpO2: 98%  98%    Weight:  100.7 kg (222 lb 0.1  "oz)     Height:        09/04/24 0017 09/04/24 0147 09/04/24 0202   BP:  (!) 180/91 (!) 174/96   Pulse:  74 71   Resp:      Temp:      TempSrc:      SpO2:  98% 97%   Weight: 102.1 kg (225 lb)     Height: 6' 2" (1.88 m)       Physical Exam    Nursing note and vitals reviewed.  Constitutional: He appears well-developed and well-nourished. He is not diaphoretic. No distress.   HENT:   Head: Normocephalic and atraumatic.   Nose: Nose normal.   Mouth/Throat: Oropharynx is clear and moist.   Eyes: Conjunctivae are normal. No scleral icterus.   Cardiovascular:  Normal rate, regular rhythm and intact distal pulses.           Pulmonary/Chest: No respiratory distress.   Abdominal: Abdomen is soft. He exhibits no distension. There is no abdominal tenderness.   Musculoskeletal:         General: No edema. Normal range of motion.     Neurological: He is alert and oriented to person, place, and time.   Skin: Skin is warm and dry.       ED Course   Procedures                   MDM  Differential Diagnoses   Based on available history, the working differential diagnoses considered during this evaluation include but are not limited to ureterolithiasis, pyelonephritis, colitis, cholelithiasis, cholecystitis, PUD.      LABS     Labs Reviewed   URINALYSIS, REFLEX TO URINE CULTURE - Abnormal       Result Value    Specimen UA Urine, Clean Catch      Color, UA Yellow      Appearance, UA Clear      pH, UA 7.0      Specific Gravity, UA 1.020      Protein, UA Negative      Glucose, UA 3+ (*)     Ketones, UA Negative      Bilirubin (UA) Negative      Occult Blood UA 2+ (*)     Nitrite, UA Positive (*)     Urobilinogen, UA Negative      Leukocytes, UA Negative      Narrative:     Specimen Source->Urine   URINALYSIS MICROSCOPIC - Abnormal    RBC, UA 6 (*)     WBC, UA 4      WBC Clumps, UA Rare      Bacteria Many (*)     Yeast, UA None      Microscopic Comment SEE COMMENT      Narrative:     Specimen Source->Urine           All available results " from the labs ordered were independently reviewed. with findings as follows: UA notable for 2+ occult blood, 6 RBCs and 4 WBCs     Imaging     Imaging Results              CT Renal Stone Study ABD Pelvis WO (Final result)  Result time 09/04/24 01:47:23      Final result by Saad Olivas MD (09/04/24 01:47:23)                   Impression:      Mild right-sided hydronephrosis and perinephric inflammatory change secondary to a 0.4 cm calculus in the distal right ureter.    1.0 cm nonobstructing left renal calculus.    Subcentimeter hyperdensity arising from the lower pole of the left kidney, possibly a hemorrhagic or proteinaceous cyst.    Mild urinary bladder wall thickening.  Correlation with urinalysis to exclude cystitis/infection advised.    Hepatomegaly and hepatic steatosis.    Additional findings as above.      Electronically signed by: Saad Olivas MD  Date:    09/04/2024  Time:    01:47               Narrative:    EXAMINATION:  CT RENAL STONE STUDY ABD PELVIS WO    CLINICAL HISTORY:  Flank pain, kidney stone suspected;    TECHNIQUE:  Low dose axial images, sagittal and coronal reformations were obtained from the lung bases to the pubic symphysis.  Contrast was not administered.    COMPARISON:  09/16/2021    FINDINGS:  There is mild bibasilar atelectasis or scarring at the visualized lung bases.  There is bilateral gynecomastia.  No significant pericardial effusion.    The liver is enlarged.  There is diffuse decreased attenuation of the hepatic parenchyma suggesting hepatic steatosis.  There is focal fatty sparing about the gallbladder fossa.  No definite radiopaque calculi identified the gallbladder lumen.  There is no intra-or extrahepatic biliary ductal dilatation.    The stomach, spleen, pancreas, and adrenal glands appear within normal limits for non-contrast technique.    The kidneys are normal in size and location. There is mild right-sided hydronephrosis and perinephric inflammatory change  secondary to a 0.4 cm calculus in the distal right ureter.  The left kidney demonstrates a 1.0 cm nonobstructing calculus.  There is no significant left-sided hydronephrosis.  There is a 0.8 cm hyperdense lesion arising from the posterior left lower pole, possibly a hemorrhagic or proteinaceous cyst.  Urinary bladder demonstrates mild nonspecific circumferential wall thickening.  Prostate is unremarkable.  No significant free fluid in the pelvis.    The abdominal aorta is normal in course and caliber without significant atherosclerotic calcifications.    The visualized loops of small and large bowel show no evidence of obstruction or inflammation. The appendix is not definitively visualized, however no localized inflammatory change is seen at its expected location. There is no ascites, free fluid, or intraperitoneal free air. There are scattered shotty small mesenteric and retroperitoneal lymph nodes.    There are mild degenerative changes of the visualized spine.  There is mild soft tissue induration within the anterior abdominal subcutaneous soft tissues which may reflect prior injection sites.                                       X-Rays:   Independently Interpreted Readings:   Abdomen:   Renal Stone Study - Kidney(s): Hydronephrosis present - right ureter(s).  1 cm nonobstructing left renal stone, 0.4cm distal right ureteral stone noted        EKG        ED Management/Discussion     Medications   ketorolac injection 9.999 mg (9.999 mg Intravenous Given 9/4/24 0016)   tamsulosin 24 hr capsule 0.4 mg (0.4 mg Oral Given 9/4/24 0015)                 The patient's list of active medical problems, social history, medications, and allergies as documented per RN staff has been reviewed.               On final assessment, the patient appears suitable for discharge.  I see no indication of an emergent process beyond that addressed during our encounter but have duly counseled the patient/family regarding the need for  prompt follow-up as well as the indications that should prompt immediate return to the emergency room.  The patient/family has been provided with language -specific verbal and printed direction regarding our final diagnosis(es) as well as instructions regarding use of OTC and/or Rx medications intended to manage the patient's aforementioned conditions including:  ED Prescriptions       Medication Sig Dispense Start Date End Date Auth. Provider    tamsulosin (FLOMAX) 0.4 mg Cap Take 1 capsule (0.4 mg total) by mouth once daily. 30 capsule 9/4/2024 9/4/2025 Nehemias Jordan MD    ketorolac (TORADOL) 10 mg tablet Take 1 tablet (10 mg total) by mouth every 8 (eight) hours as needed for Pain. 15 tablet 9/4/2024 -- Nehemias Jordan MD    ondansetron (ZOFRAN-ODT) 4 MG TbDL Take 1 tablet (4 mg total) by mouth every 6 (six) hours as needed (nausea). 12 tablet 9/4/2024 -- Nehemias Jordan MD              Patient has been advised of the following recommended follow-up instructions:  Follow-up Information       Follow up With Specialties Details Why Contact Info    Martinez Ang NP Family Medicine Schedule an appointment as soon as possible for a visit  for reassessment [address] [facility] [address] [phone]      [facility] Emergency Dept Emergency Medicine Go to  As needed, If symptoms worsen [address] [facility] [address]  Emergency Department  [facility] [id number] [phone]          The patient/family communicates understanding of all this information and all remaining questions and concerns were addressed at this time.      Referrals:  No orders of the defined types were placed in this encounter.      CLINICAL IMPRESSION    ICD-10-CM ICD-9-CM   1. Ureterolithiasis  N20.1 592.1   2. Nephrolithiasis  N20.0 592.0          ED Disposition Condition    Discharge Stable                 Nehemias Jordan MD  09/04/24 4183     PAST MEDICAL HISTORY:  DM (diabetes mellitus)     Dyslipidemia

## 2024-10-22 ENCOUNTER — HOSPITAL ENCOUNTER (EMERGENCY)
Facility: HOSPITAL | Age: 54
Discharge: HOME | End: 2024-10-22
Attending: EMERGENCY MEDICINE

## 2024-10-22 VITALS
HEART RATE: 83 BPM | SYSTOLIC BLOOD PRESSURE: 172 MMHG | DIASTOLIC BLOOD PRESSURE: 88 MMHG | HEIGHT: 74 IN | TEMPERATURE: 98 F | RESPIRATION RATE: 20 BRPM | OXYGEN SATURATION: 99 % | WEIGHT: 237.44 LBS | BODY MASS INDEX: 30.47 KG/M2

## 2024-10-22 DIAGNOSIS — N20.0 KIDNEY STONE: ICD-10-CM

## 2024-10-22 DIAGNOSIS — N30.01 ACUTE CYSTITIS WITH HEMATURIA: Primary | ICD-10-CM

## 2024-10-22 LAB
BACTERIA #/AREA URNS AUTO: ABNORMAL /HPF
BILIRUB UR QL STRIP: NEGATIVE
CLARITY UR REFRACT.AUTO: CLEAR
COLOR UR AUTO: ABNORMAL
GLUCOSE UR QL STRIP: ABNORMAL
HGB UR QL STRIP: ABNORMAL
KETONES UR QL STRIP: NEGATIVE
LEUKOCYTE ESTERASE UR QL STRIP: ABNORMAL
MICROSCOPIC COMMENT: ABNORMAL
NITRITE UR QL STRIP: NEGATIVE
PH UR STRIP: 6 [PH] (ref 5–8)
PROT UR QL STRIP: ABNORMAL
RBC #/AREA URNS AUTO: 100 /HPF (ref 0–4)
SP GR UR STRIP: 1.01 (ref 1–1.03)
URN SPEC COLLECT METH UR: ABNORMAL
UROBILINOGEN UR STRIP-ACNC: NEGATIVE EU/DL
WBC #/AREA URNS AUTO: 15 /HPF (ref 0–5)
YEAST UR QL AUTO: ABNORMAL

## 2024-10-22 PROCEDURE — 99285 EMERGENCY DEPT VISIT HI MDM: CPT | Mod: 25,ER

## 2024-10-22 PROCEDURE — 81000 URINALYSIS NONAUTO W/SCOPE: CPT | Mod: ER | Performed by: EMERGENCY MEDICINE

## 2024-10-22 PROCEDURE — 87086 URINE CULTURE/COLONY COUNT: CPT | Performed by: EMERGENCY MEDICINE

## 2024-10-22 RX ORDER — CEFUROXIME AXETIL 500 MG/1
500 TABLET ORAL 2 TIMES DAILY
Qty: 20 TABLET | Refills: 0 | Status: SHIPPED | OUTPATIENT
Start: 2024-10-22 | End: 2024-11-01

## 2024-10-22 NOTE — Clinical Note
"Iftikhar "Iftikhar" Grant was seen and treated in our emergency department on 10/22/2024.  He may return to work on 10/23/2024.       If you have any questions or concerns, please don't hesitate to call.       RN    "

## 2024-10-22 NOTE — Clinical Note
"Iftikhar "Iftikhar" Grant was seen and treated in our emergency department on 10/22/2024.  He may return to school on 10/23/2024.      If you have any questions or concerns, please don't hesitate to call.       RN"

## 2024-10-22 NOTE — ED PROVIDER NOTES
Encounter Date: 10/22/2024       History     Chief Complaint   Patient presents with    Hematuria     Noticed blood in urine yest. Hx kidney stones last month. Denies any pain.     The history is provided by the patient.   Hematuria  This is a recurrent problem. The current episode started yesterday. The problem is unchanged. He describes the hematuria as gross hematuria. Associated symptoms include dysuria. Pertinent negatives include no chills, fever, nausea or vomiting.     Review of patient's allergies indicates:  No Known Allergies  Past Medical History:   Diagnosis Date    Diabetes mellitus     High cholesterol     Kidney stones      Past Surgical History:   Procedure Laterality Date    NO PAST SURGERIES       Family History   Problem Relation Name Age of Onset    Diabetes Mother      Cancer Father       Social History     Tobacco Use    Smoking status: Never    Smokeless tobacco: Never   Substance Use Topics    Alcohol use: No    Drug use: No     Review of Systems   Constitutional:  Negative for chills, fatigue and fever.   HENT:  Negative for congestion.    Respiratory:  Negative for cough and shortness of breath.    Gastrointestinal:  Negative for diarrhea, nausea and vomiting.   Genitourinary:  Positive for dysuria and hematuria.   Neurological:  Negative for weakness and numbness.       Physical Exam     Initial Vitals [10/22/24 0952]   BP Pulse Resp Temp SpO2   (!) 172/88 83 20 97.5 °F (36.4 °C) 99 %      MAP       --         Physical Exam    Constitutional: He appears well-developed and well-nourished. No distress.   HENT:   Head: Normocephalic and atraumatic.   Eyes: Conjunctivae are normal. Pupils are equal, round, and reactive to light.   Neck: Neck supple.   Normal range of motion.  Cardiovascular:  Normal rate, regular rhythm and normal heart sounds.           Pulmonary/Chest: Breath sounds normal.   Abdominal: Abdomen is soft. Bowel sounds are normal.   Musculoskeletal:         General: Normal  range of motion.      Cervical back: Normal range of motion and neck supple.     Neurological: He is alert and oriented to person, place, and time. No cranial nerve deficit.   Skin: Skin is warm and dry.   Psychiatric: He has a normal mood and affect.         ED Course   Procedures  Labs Reviewed   URINALYSIS, REFLEX TO URINE CULTURE - Abnormal       Result Value    Specimen UA Urine, Clean Catch      Color, UA Other (*)     Appearance, UA Clear      pH, UA 6.0      Specific Gravity, UA 1.010      Protein, UA Trace (*)     Glucose, UA 3+ (*)     Ketones, UA Negative      Bilirubin (UA) Negative      Occult Blood UA 3+ (*)     Nitrite, UA Negative      Urobilinogen, UA Negative      Leukocytes, UA 1+ (*)     Narrative:     Specimen Source->Urine   URINALYSIS MICROSCOPIC - Abnormal    RBC,  (*)     WBC, UA 15 (*)     Bacteria Occasional      Yeast, UA None      Microscopic Comment SEE COMMENT      Narrative:     Specimen Source->Urine   CULTURE, URINE          Imaging Results               CT Renal Stone Study ABD Pelvis WO (Final result)  Result time 10/22/24 10:44:17      Final result by Raymundo Neal MD (10/22/24 10:44:17)                   Impression:      6 mm distal right ureteral stone stable from the prior.    Mild bladder wall thickening.  Correlation with urinalysis to exclude infection.    Complete findings as above.    This report was flagged in Epic as abnormal.    All CT scans at this facility are performed  using dose modulation techniques as appropriate to performed exam including the following:  automated exposure control; adjustment of mA and/or kV according to the patients size (this includes techniques or standardized protocols for targeted exams where dose is matched to indication/reason for exam: i.e. extremities or head);  iterative reconstruction technique.      Electronically signed by: Raymundo Neal  Date:    10/22/2024  Time:    10:44               Narrative:    EXAMINATION:  CT  RENAL STONE STUDY ABD PELVIS WO    CLINICAL HISTORY:  Flank pain, kidney stone suspected;    TECHNIQUE:  Low dose axial images, sagittal and coronal reformations were obtained from the lung bases to the pubic symphysis.  Contrast was not administered.    COMPARISON:  Multiple    FINDINGS:  Heart: Normal in size. No pericardial effusion.    Lung Bases: Well aerated, without consolidation or pleural fluid.    Liver: Hepatomegaly and hepatic steatosis.    Gallbladder: No calcified gallstones.    Bile Ducts: No evidence of dilated ducts.    Pancreas: No mass or peripancreatic fat stranding.    Spleen: Unremarkable.    Adrenals: Unremarkable.    Kidneys/ Ureters: Left nonobstructive nephrolithiasis.  Distal right ureteral stone on the order of 6 mm stable from the prior.  No hydronephrosis.  High density left renal lesions stable.    Bladder: Mild bladder wall thickening.  Correlation with urinalysis to exclude infection.    Reproductive organs: Unremarkable.    GI Tract/Mesentery: No evidence of bowel obstruction or inflammation.  No evidence of appendicitis.  Diverticulosis without diverticulitis.    Peritoneal Space: No ascites. No free air.    Retroperitoneum: No significant adenopathy.    Abdominal wall: Unremarkable.    Vasculature: No significant atherosclerosis or aneurysm.    Bones: No acute fracture.                                       Medications - No data to display  Medical Decision Making  DDx: kidney stone, UTI    Amount and/or Complexity of Data Reviewed  Labs: ordered.     Details: UTI  Radiology: ordered.     Details: Stable kidney stone  Discussion of management or test interpretation with external provider(s): Rx abx    Risk  Prescription drug management.                                      Clinical Impression:  Final diagnoses:  [N30.01] Acute cystitis with hematuria (Primary)  [N20.0] Kidney stone          ED Disposition Condition    Discharge Stable          ED Prescriptions       Medication Sig  Dispense Start Date End Date Auth. Provider    cefUROXime (CEFTIN) 500 MG tablet Take 1 tablet (500 mg total) by mouth 2 (two) times daily. for 10 days 20 tablet 10/22/2024 11/1/2024 Rudolph Meneses MD          Follow-up Information       Follow up With Specialties Details Why Contact Info    Martinez Ang, NP Family Medicine   [address] [facility] [address] [phone]               Rudolph Meneses MD  10/22/24 3065

## 2024-10-23 LAB
BACTERIA UR CULT: NORMAL
BACTERIA UR CULT: NORMAL

## 2025-01-30 ENCOUNTER — HOSPITAL ENCOUNTER (EMERGENCY)
Facility: HOSPITAL | Age: 55
Discharge: HOME | End: 2025-01-30
Attending: EMERGENCY MEDICINE
Payer: COMMERCIAL

## 2025-01-30 VITALS
TEMPERATURE: 98 F | WEIGHT: 219.25 LBS | SYSTOLIC BLOOD PRESSURE: 136 MMHG | HEIGHT: 74 IN | RESPIRATION RATE: 20 BRPM | OXYGEN SATURATION: 100 % | BODY MASS INDEX: 28.14 KG/M2 | HEART RATE: 91 BPM | DIASTOLIC BLOOD PRESSURE: 88 MMHG

## 2025-01-30 DIAGNOSIS — N18.9 CHRONIC RENAL IMPAIRMENT, UNSPECIFIED CKD STAGE: ICD-10-CM

## 2025-01-30 DIAGNOSIS — R73.9 HYPERGLYCEMIA: ICD-10-CM

## 2025-01-30 DIAGNOSIS — N30.00 ACUTE CYSTITIS WITHOUT HEMATURIA: Primary | ICD-10-CM

## 2025-01-30 LAB
ALBUMIN SERPL BCP-MCNC: 4.5 G/DL (ref 3.5–5.2)
ALP SERPL-CCNC: 125 U/L (ref 40–150)
ALT SERPL W/O P-5'-P-CCNC: 27 U/L (ref 10–44)
ANION GAP SERPL CALC-SCNC: 16 MMOL/L (ref 8–16)
AST SERPL-CCNC: 20 U/L (ref 10–40)
BACTERIA #/AREA URNS AUTO: ABNORMAL /HPF
BASOPHILS # BLD AUTO: 0.02 K/UL (ref 0–0.2)
BASOPHILS NFR BLD: 0.2 % (ref 0–1.9)
BILIRUB SERPL-MCNC: 0.4 MG/DL (ref 0.1–1)
BILIRUB UR QL STRIP: NEGATIVE
BUN SERPL-MCNC: 24 MG/DL (ref 6–20)
CALCIUM SERPL-MCNC: 9.5 MG/DL (ref 8.7–10.5)
CHLORIDE SERPL-SCNC: 100 MMOL/L (ref 95–110)
CLARITY UR REFRACT.AUTO: CLEAR
CO2 SERPL-SCNC: 19 MMOL/L (ref 23–29)
COLOR UR AUTO: YELLOW
CREAT SERPL-MCNC: 2 MG/DL (ref 0.5–1.4)
DIFFERENTIAL METHOD BLD: ABNORMAL
EOSINOPHIL # BLD AUTO: 0.1 K/UL (ref 0–0.5)
EOSINOPHIL NFR BLD: 1 % (ref 0–8)
ERYTHROCYTE [DISTWIDTH] IN BLOOD BY AUTOMATED COUNT: 14.6 % (ref 11.5–14.5)
EST. GFR  (NO RACE VARIABLE): 38.4 ML/MIN/1.73 M^2
GLUCOSE SERPL-MCNC: 494 MG/DL (ref 70–110)
GLUCOSE UR QL STRIP: ABNORMAL
HCT VFR BLD AUTO: 40.6 % (ref 40–54)
HGB BLD-MCNC: 13.2 G/DL (ref 14–18)
HGB UR QL STRIP: ABNORMAL
IMM GRANULOCYTES # BLD AUTO: 0.02 K/UL (ref 0–0.04)
IMM GRANULOCYTES NFR BLD AUTO: 0.2 % (ref 0–0.5)
KETONES UR QL STRIP: ABNORMAL
LEUKOCYTE ESTERASE UR QL STRIP: NEGATIVE
LYMPHOCYTES # BLD AUTO: 2.6 K/UL (ref 1–4.8)
LYMPHOCYTES NFR BLD: 31.2 % (ref 18–48)
MCH RBC QN AUTO: 25.5 PG (ref 27–31)
MCHC RBC AUTO-ENTMCNC: 32.5 G/DL (ref 32–36)
MCV RBC AUTO: 79 FL (ref 82–98)
MICROSCOPIC COMMENT: ABNORMAL
MONOCYTES # BLD AUTO: 0.6 K/UL (ref 0.3–1)
MONOCYTES NFR BLD: 6.6 % (ref 4–15)
NEUTROPHILS # BLD AUTO: 5.1 K/UL (ref 1.8–7.7)
NEUTROPHILS NFR BLD: 60.8 % (ref 38–73)
NITRITE UR QL STRIP: POSITIVE
NRBC BLD-RTO: 0 /100 WBC
PH UR STRIP: 6 [PH] (ref 5–8)
PLATELET # BLD AUTO: 228 K/UL (ref 150–450)
PMV BLD AUTO: 10.2 FL (ref 9.2–12.9)
POTASSIUM SERPL-SCNC: 4.4 MMOL/L (ref 3.5–5.1)
PROT SERPL-MCNC: 8.7 G/DL (ref 6–8.4)
PROT UR QL STRIP: ABNORMAL
RBC # BLD AUTO: 5.17 M/UL (ref 4.6–6.2)
RBC #/AREA URNS AUTO: 1 /HPF (ref 0–4)
SODIUM SERPL-SCNC: 135 MMOL/L (ref 136–145)
SP GR UR STRIP: 1.02 (ref 1–1.03)
URN SPEC COLLECT METH UR: ABNORMAL
UROBILINOGEN UR STRIP-ACNC: NEGATIVE EU/DL
WBC # BLD AUTO: 8.39 K/UL (ref 3.9–12.7)
WBC #/AREA URNS AUTO: 12 /HPF (ref 0–5)
WBC CLUMPS UR QL AUTO: ABNORMAL
YEAST UR QL AUTO: ABNORMAL

## 2025-01-30 PROCEDURE — 99285 EMERGENCY DEPT VISIT HI MDM: CPT | Mod: 25,ER

## 2025-01-30 PROCEDURE — 85025 COMPLETE CBC W/AUTO DIFF WBC: CPT | Mod: ER | Performed by: EMERGENCY MEDICINE

## 2025-01-30 PROCEDURE — 87088 URINE BACTERIA CULTURE: CPT | Performed by: EMERGENCY MEDICINE

## 2025-01-30 PROCEDURE — 87389 HIV-1 AG W/HIV-1&-2 AB AG IA: CPT | Performed by: EMERGENCY MEDICINE

## 2025-01-30 PROCEDURE — 80053 COMPREHEN METABOLIC PANEL: CPT | Mod: ER | Performed by: EMERGENCY MEDICINE

## 2025-01-30 PROCEDURE — 87186 SC STD MICRODIL/AGAR DIL: CPT | Performed by: EMERGENCY MEDICINE

## 2025-01-30 PROCEDURE — 87086 URINE CULTURE/COLONY COUNT: CPT | Performed by: EMERGENCY MEDICINE

## 2025-01-30 PROCEDURE — 81000 URINALYSIS NONAUTO W/SCOPE: CPT | Mod: ER | Performed by: EMERGENCY MEDICINE

## 2025-01-30 PROCEDURE — 83036 HEMOGLOBIN GLYCOSYLATED A1C: CPT | Performed by: EMERGENCY MEDICINE

## 2025-01-30 PROCEDURE — 25000003 PHARM REV CODE 250: Mod: ER | Performed by: EMERGENCY MEDICINE

## 2025-01-30 PROCEDURE — 86803 HEPATITIS C AB TEST: CPT | Performed by: EMERGENCY MEDICINE

## 2025-01-30 RX ORDER — CIPROFLOXACIN 500 MG/1
500 TABLET ORAL
Status: COMPLETED | OUTPATIENT
Start: 2025-01-30 | End: 2025-01-30

## 2025-01-30 RX ORDER — CIPROFLOXACIN 500 MG/1
500 TABLET ORAL 2 TIMES DAILY
Qty: 14 TABLET | Refills: 0 | Status: SHIPPED | OUTPATIENT
Start: 2025-01-30 | End: 2025-02-06

## 2025-01-30 RX ADMIN — CIPROFLOXACIN 500 MG: 500 TABLET ORAL at 10:01

## 2025-01-31 LAB
HCV AB SERPL QL IA: NEGATIVE
HEP C VIRUS HOLD SPECIMEN: NORMAL
HIV 1+2 AB+HIV1 P24 AG SERPL QL IA: NEGATIVE

## 2025-01-31 NOTE — DISCHARGE INSTRUCTIONS
As discussed, you have a mild urinary tract infection but more concerning is your poorly controlled diabetes which is causing decreasing kidney function as well.      Take all your medicines as prescribed, do not miss doses, cut back on sugars and starches, talk to primary care in the morning about short-term follow-up to readdress and adjust your medicines.      You probably also need a referral to see a kidney doctor, talk to primary care about this as well.      Take a copy of these notes and results with you. Take all medicines with you to the appointment.

## 2025-01-31 NOTE — ED PROVIDER NOTES
"Encounter Date: 1/30/2025       History     Chief Complaint   Patient presents with    Bladder Pain     Hx of UTIs. "I feel like I need to pee". Reports urinating 30 minutes PTA but still having pain.      He reports some history of kidney stone in the past, now has symptoms for about a week urinary urgency, frequency, possibly incomplete voiding.  No gretchen dysuria or hematuria.  He indicates some discomfort deep in his groin and genitalia region.  No skin lesions.  Other history includes diabetes and hyperlipidemia.  Pain at times seems to go to the bilateral flank regions.  No fever, chills, sweats, nausea, vomiting, back pain, urethral discharge, or other specific complaints.  Review of old records shows poorly controlled diabetes and gradually progressing renal insufficiency with stable nephrolithiasis.    The history is provided by the patient. No  was used.     Review of patient's allergies indicates:  No Known Allergies  Past Medical History:   Diagnosis Date    Diabetes mellitus     High cholesterol     Kidney stones      Past Surgical History:   Procedure Laterality Date    NO PAST SURGERIES       Family History   Problem Relation Name Age of Onset    Diabetes Mother      Cancer Father       Social History     Tobacco Use    Smoking status: Never    Smokeless tobacco: Never   Substance Use Topics    Alcohol use: No    Drug use: No     Review of Systems   Genitourinary:  Positive for flank pain, frequency and urgency.       Physical Exam     Initial Vitals [01/30/25 1942]   BP Pulse Resp Temp SpO2   (!) 160/93 100 20 98.1 °F (36.7 °C) 97 %      MAP       --         Physical Exam    Nursing note and vitals reviewed.  Constitutional: He appears well-developed and well-nourished. No distress.   HENT:   Head: Normocephalic and atraumatic.   Eyes: EOM are normal. Pupils are equal, round, and reactive to light.   Neck: Neck supple.   Normal range of motion.  Cardiovascular:  Normal rate and " regular rhythm.           Pulmonary/Chest: Breath sounds normal. No respiratory distress.   Abdominal: Abdomen is soft. Bowel sounds are normal. There is abdominal tenderness.   Slight suprapubic tenderness, questionable bilateral lower abdominal quadrant tenderness, no other convincing findings   Musculoskeletal:         General: No tenderness. Normal range of motion.      Cervical back: Normal range of motion and neck supple.     Neurological: He is alert and oriented to person, place, and time. He has normal strength.   Skin: Skin is warm and dry.   Psychiatric: He has a normal mood and affect. Thought content normal.         ED Course   Procedures  Labs Reviewed   CBC W/ AUTO DIFFERENTIAL - Abnormal       Result Value    WBC 8.39      RBC 5.17      Hemoglobin 13.2 (*)     Hematocrit 40.6      MCV 79 (*)     MCH 25.5 (*)     MCHC 32.5      RDW 14.6 (*)     Platelets 228      MPV 10.2      Immature Granulocytes 0.2      Gran # (ANC) 5.1      Immature Grans (Abs) 0.02      Lymph # 2.6      Mono # 0.6      Eos # 0.1      Baso # 0.02      nRBC 0      Gran % 60.8      Lymph % 31.2      Mono % 6.6      Eosinophil % 1.0      Basophil % 0.2      Differential Method Automated     COMPREHENSIVE METABOLIC PANEL - Abnormal    Sodium 135 (*)     Potassium 4.4      Chloride 100      CO2 19 (*)     Glucose 494 (*)     BUN 24 (*)     Creatinine 2.0 (*)     Calcium 9.5      Total Protein 8.7 (*)     Albumin 4.5      Total Bilirubin 0.4      Alkaline Phosphatase 125      AST 20      ALT 27      eGFR 38.4 (*)     Anion Gap 16      Narrative:     gluc critical result(s) called and verbal readback obtained from   greta garibay rn by BB5 01/30/2025 21:57   URINALYSIS, REFLEX TO URINE CULTURE - Abnormal    Specimen UA Urine, Clean Catch      Color, UA Yellow      Appearance, UA Clear      pH, UA 6.0      Specific Gravity, UA 1.020      Protein, UA Trace (*)     Glucose, UA 3+ (*)     Ketones, UA Trace (*)     Bilirubin (UA) Negative       Occult Blood UA 2+ (*)     Nitrite, UA Positive (*)     Urobilinogen, UA Negative      Leukocytes, UA Negative      Narrative:     Specimen Source->Urine   URINALYSIS MICROSCOPIC - Abnormal    RBC, UA 1      WBC, UA 12 (*)     WBC Clumps, UA Rare      Bacteria Moderate (*)     Yeast, UA None      Microscopic Comment SEE COMMENT      Narrative:     Specimen Source->Urine   CULTURE, URINE   HEPATITIS C ANTIBODY   HEP C VIRUS HOLD SPECIMEN   HIV 1 / 2 ANTIBODY   HEMOGLOBIN A1C          Imaging Results              CT Renal Stone Study ABD Pelvis WO (Final result)  Result time 01/30/25 21:26:59      Final result by Andrea Delgado MD (01/30/25 21:26:59)                   Impression:      1.  Negative for acute process involving the abdomen or pelvis.  Normal appendix.  Negative for obstructing renal stones or hydronephrosis.  Negative for free air.  Negative for inflammatory changes.    2. Numerous stable findings as noted above.  This includes a 1.2 cm nonobstructing inferior pole left renal stone, and 1 cm hyperdense cyst in the left kidney.    All CT scans at this facility are performed  using dose modulation techniques as appropriate to performed exam including the following:  automated exposure control; adjustment of mA and/or kV according to the patients size (this includes techniques or standardized protocols for targeted exams where dose is matched to indication/reason for exam: i.e. extremities or head);  iterative reconstruction technique.      Electronically signed by: Andrea Delgado MD  Date:    01/30/2025  Time:    21:26               Narrative:    EXAMINATION:  CT RENAL STONE STUDY ABD PELVIS WO, multiplanar reconstructions    CLINICAL HISTORY:  Flank pain, kidney stone suspected;    TECHNIQUE:  Axial images through the abdomen and pelvis were obtained without the use of IV contrast.  Sagittal and coronal reconstructions are provided for review.  Oral contrast was not utilized.    COMPARISON:  October  22, 2024    FINDINGS:  LUNG BASES: Moderate dependent atelectasis in the lung bases.  Stable inferior middle lobe scarring or atelectasis.  Lung bases are otherwise clear.  Negative for pleural or pericardial effusions. The distal esophagus is normal.  Negative for coronary artery calcifications.    ABDOMEN: Fatty infiltration of the liver with fatty sparing along the gallbladder fossa.  Stable 1.2 cm nonobstructing inferior pole left renal stone.  Stable 1 cm exophytic hyperdense nodule within the inferior left kidney.  Subcentimeter superior pole right renal cyst.  The liver, spleen and gallbladder otherwise appear normal.  The pancreas is unremarkable.  Kidneys and adrenal glands are otherwise mild normal.    Negative for adenopathy, free fluid or inflammatory change noted within the abdomen or pelvis.  Vascular calcifications are present without aneurysmal changes.    The bowel appears normal. Appropriate stool.  Negative for dilated loops of large or small bowel.  Normal appendix.  Negative for free air.    PELVIS: The urinary bladder is unremarkable.    The male pelvic organs are normal. There are pelvic phleboliths.    There is laxity of the linea alba.  There are sites of injection within the anterior abdominal wall.  The abdominal wall is otherwise intact.    No significant osseous abnormality is identified.  Negative for significant spinal canal stenosis. Stable multilevel marginal spondylosis.    Negative for groin adenopathy.                                       Medications   ciprofloxacin HCl tablet 500 mg (has no administration in time range)       10:17 PM Counseled patient and family in detail.  He has available short-term primary care follow-up in appears to be taking his medicines at least most of the time.  Discussed the nature of the findings in the severity the hyperglycemia and its affect on renal function, discharge with the following instructions:      As discussed, you have a mild urinary  tract infection but more concerning is your poorly controlled diabetes which is causing decreasing kidney function as well.      Take all your medicines as prescribed, do not miss doses, cut back on sugars and starches, talk to primary care in the morning about short-term follow-up to readdress and adjust your medicines.      You probably also need a referral to see a kidney doctor, talk to primary care about this as well.      Take a copy of these notes and results with you. Take all medicines with you to the appointment.              Medical Decision Making  Patient with poorly controlled diabetes and chronic renal insufficiency, underlying nephrolithiasis presents with mild urinary symptoms, found to have mild urinary tract infection and worsened hyperglycemia with progressing renal insufficiency.  Appropriate for aggressive outpatient management and short-term follow-up.    Problems Addressed:  Acute cystitis without hematuria: acute illness or injury  Chronic renal impairment, unspecified CKD stage: chronic illness or injury with exacerbation, progression, or side effects of treatment  Hyperglycemia: chronic illness or injury with exacerbation, progression, or side effects of treatment    Amount and/or Complexity of Data Reviewed  Labs: ordered. Decision-making details documented in ED Course.  Radiology: ordered. Decision-making details documented in ED Course.    Risk  Prescription drug management.  Decision regarding hospitalization.      Additional MDM:   Differential Diagnosis:   Urinary tract infection, hyperglycemia, urinary tract stone among many others                                    Clinical Impression:  Final diagnoses:  [N30.00] Acute cystitis without hematuria (Primary)  [R73.9] Hyperglycemia  [N18.9] Chronic renal impairment, unspecified CKD stage          ED Disposition Condition    Discharge Stable          ED Prescriptions       Medication Sig Dispense Start Date End Date Auth. Provider     ciprofloxacin HCl (CIPRO) 500 MG tablet Take 1 tablet (500 mg total) by mouth 2 (two) times daily. for 7 days 14 tablet 1/30/2025 2/6/2025 Freddie Baker MD          Follow-up Information       Follow up With Specialties Details Why Contact Info    [facility] - Emergency Dept Emergency Medicine  As needed [id number] [facility] [address]  Emergency Department  [facility] [id number] [phone]    Martinez Ang, NP Family Medicine In 1 day  [address] [facility] [address] [phone]               Freddie Baker MD  01/30/25 [id number]

## 2025-02-01 LAB
ESTIMATED AVG GLUCOSE: ABNORMAL MG/DL (ref 68–131)
HBA1C MFR BLD: >14 % (ref 4–5.6)

## 2025-02-03 LAB — BACTERIA UR CULT: ABNORMAL

## 2025-02-04 NOTE — PROGRESS NOTES
The antibiotic the patient received maybe ineffective against his urinary tract infection due to bacterial resistance.    Please call in a prescription of: cefdinir 300 mg, take 1 tablet by mouth twice a day for 7 days.  Dispense 14.

## 2025-02-05 ENCOUNTER — TELEPHONE (OUTPATIENT)
Dept: EMERGENCY MEDICINE | Facility: HOSPITAL | Age: 55
End: 2025-02-05
Payer: COMMERCIAL

## 2025-02-10 ENCOUNTER — OFFICE VISIT (OUTPATIENT)
Dept: PULMONOLOGY | Facility: CLINIC | Age: 55
End: 2025-02-10
Payer: COMMERCIAL

## 2025-02-10 VITALS
HEART RATE: 91 BPM | BODY MASS INDEX: 29.27 KG/M2 | RESPIRATION RATE: 18 BRPM | WEIGHT: 228.06 LBS | OXYGEN SATURATION: 98 % | SYSTOLIC BLOOD PRESSURE: 142 MMHG | HEIGHT: 74 IN | DIASTOLIC BLOOD PRESSURE: 88 MMHG

## 2025-02-10 DIAGNOSIS — J43.9 MIXED RESTRICTIVE AND OBSTRUCTIVE LUNG DISEASE: Primary | ICD-10-CM

## 2025-02-10 DIAGNOSIS — J98.4 MIXED RESTRICTIVE AND OBSTRUCTIVE LUNG DISEASE: Primary | ICD-10-CM

## 2025-02-10 PROCEDURE — 99203 OFFICE O/P NEW LOW 30 MIN: CPT | Mod: S$GLB,,, | Performed by: PHYSICIAN ASSISTANT

## 2025-02-10 PROCEDURE — 3079F DIAST BP 80-89 MM HG: CPT | Mod: CPTII,S$GLB,, | Performed by: PHYSICIAN ASSISTANT

## 2025-02-10 PROCEDURE — 3008F BODY MASS INDEX DOCD: CPT | Mod: CPTII,S$GLB,, | Performed by: PHYSICIAN ASSISTANT

## 2025-02-10 PROCEDURE — 1159F MED LIST DOCD IN RCRD: CPT | Mod: CPTII,S$GLB,, | Performed by: PHYSICIAN ASSISTANT

## 2025-02-10 PROCEDURE — 3046F HEMOGLOBIN A1C LEVEL >9.0%: CPT | Mod: CPTII,S$GLB,, | Performed by: PHYSICIAN ASSISTANT

## 2025-02-10 PROCEDURE — 3077F SYST BP >= 140 MM HG: CPT | Mod: CPTII,S$GLB,, | Performed by: PHYSICIAN ASSISTANT

## 2025-02-10 PROCEDURE — 1160F RVW MEDS BY RX/DR IN RCRD: CPT | Mod: CPTII,S$GLB,, | Performed by: PHYSICIAN ASSISTANT

## 2025-02-10 PROCEDURE — 99999 PR PBB SHADOW E&M-EST. PATIENT-LVL V: CPT | Mod: PBBFAC,,, | Performed by: PHYSICIAN ASSISTANT

## 2025-02-10 RX ORDER — FENOFIBRATE 48 MG/1
48 TABLET, FILM COATED ORAL
COMMUNITY
Start: 2024-12-04

## 2025-02-10 RX ORDER — CEFDINIR 300 MG/1
300 CAPSULE ORAL 2 TIMES DAILY
COMMUNITY
Start: 2025-02-05

## 2025-02-10 NOTE — PROGRESS NOTES
"Subjective:       Patient ID: Iftikhar Dickson Burns is a 54 y.o. male.    Chief Complaint: Shortness of Breath      2/10/2025  New patient here to established pulmonary care  Saw [facility] 11/2024 but says he needs to switch due to insurance  Patient reports he was suppose to have Chest CT to evaluate lungs but needs it done here  Somewhat poor historian; he says no known history of lung disease; just told he has obstruction  Previous pulmonary note reports mixed restrictive and obstructive disease  Abdominal CT with mild scaring of lung bases and plan was for Chest CT to evaluate restriction  Never smoker   - [facility]  No cough, no wheezing  Family history of asthma  He reports mild SOB with moderate to strenuous exertion  Has albuterol inhaler which he is not using?      Immunization History   Administered Date(s) Administered    COVID-19, MRNA, LN-S, PF (MODERNA FULL 0.5 ML DOSE) 04/15/2021, 05/13/2021    Influenza - Quadrivalent - PF *Preferred* (6 months and older) 01/08/2015    Td - PF (ADULT) 07/22/2016      Tobacco Use: Low Risk  (2/10/2025)    Patient History     Smoking Tobacco Use: Never     Smokeless Tobacco Use: Never     Passive Exposure: Not on file      Past Medical History:   Diagnosis Date    Diabetes mellitus     High cholesterol     Kidney stones       Current Outpatient Medications on File Prior to Visit   Medication Sig Dispense Refill    amLODIPine (NORVASC) 5 MG tablet Take 5 mg by mouth nightly.      aspirin 81 MG Chew Take 81 mg by mouth once daily.      BD ULTRA-FINE SHORT PEN NEEDLE 31 gauge x 5/16" Ndle Inject into the skin 4 (four) times daily.      blood sugar diagnostic (TRUE METRIX GLUCOSE TEST STRIP) Strp Check b/s TID      cefdinir (OMNICEF) 300 MG capsule Take 300 mg by mouth 2 (two) times daily.      fenofibrate (TRICOR) 48 MG tablet Take 48 mg by mouth.      insulin (LANTUS SOLOSTAR U-100 INSULIN) glargine 100 units/mL (3mL) SubQ pen Inject 45 Units " "into the skin.      insulin aspart U-100 (NOVOLOG) 100 unit/mL (3 mL) InPn pen Inject 23 Units into the skin 3 (three) times daily with meals.       ketorolac (TORADOL) 10 mg tablet Take 1 tablet (10 mg total) by mouth every 8 (eight) hours as needed for Pain. 15 tablet 0    lisinopriL (PRINIVIL,ZESTRIL) 20 MG tablet Take 20 mg by mouth.      metformin (GLUCOPHAGE) 500 MG tablet Take 500 mg by mouth 2 (two) times daily.      mupirocin (BACTROBAN) 2 % ointment Apply topically to affected area three times daily. 22 g 0    ondansetron (ZOFRAN-ODT) 4 MG TbDL Take 1 tablet (4 mg total) by mouth every 6 (six) hours as needed (nausea). 12 tablet 0    pen needle, diabetic 31 gauge x 1/4" Ndle Use QID with Lantus and Novolog      pravastatin (PRAVACHOL) 20 MG tablet Take 20 mg by mouth once daily.      tamsulosin (FLOMAX) 0.4 mg Cap Take 1 capsule (0.4 mg total) by mouth once daily. 30 capsule 0     No current facility-administered medications on file prior to visit.        Review of Systems   Constitutional:  Negative for fever, weight loss, appetite change, fatigue and weakness.   HENT:  Negative for postnasal drip, rhinorrhea, sinus pressure, trouble swallowing and congestion.    Respiratory:  Negative for cough, sputum production, choking, chest tightness, shortness of breath, wheezing and dyspnea on extertion.    Cardiovascular:  Negative for chest pain and leg swelling.   Musculoskeletal:  Negative for arthralgias, gait problem and joint swelling.   Gastrointestinal:  Negative for nausea, vomiting and abdominal pain.   Neurological:  Negative for dizziness, weakness and headaches.   All other systems reviewed and are negative.      Objective:       Vitals:    02/10/25 1037   BP: (!) 142/88   BP Location: Right arm   Patient Position: Sitting   Pulse: 91   Resp: 18   SpO2: 98%   Weight: 103.4 kg (228 lb 1.1 oz)   Height: 6' 2" (1.88 m)       Physical Exam   Constitutional: He is oriented to person, place, and time. He " appears well-developed and well-nourished. No distress.   HENT:   Head: Normocephalic.   Mouth/Throat: Oropharynx is clear and moist.   Cardiovascular: Normal rate and regular rhythm.   Pulmonary/Chest: Effort normal. No respiratory distress. He has no wheezes. He has no rhonchi. He has no rales.   Musculoskeletal:         General: No edema.      Cervical back: Normal range of motion and neck supple.   Neurological: He is alert and oriented to person, place, and time. Gait normal.   Skin: Skin is warm and dry.   Psychiatric: He has a normal mood and affect.   Vitals reviewed.    Personal Diagnostic Review    CT Renal Stone Study ABD Pelvis WO  Narrative: EXAMINATION:  CT RENAL STONE STUDY ABD PELVIS WO, multiplanar reconstructions    CLINICAL HISTORY:  Flank pain, kidney stone suspected;    TECHNIQUE:  Axial images through the abdomen and pelvis were obtained without the use of IV contrast.  Sagittal and coronal reconstructions are provided for review.  Oral contrast was not utilized.    COMPARISON:  October 22, 2024    FINDINGS:  LUNG BASES: Moderate dependent atelectasis in the lung bases.  Stable inferior middle lobe scarring or atelectasis.  Lung bases are otherwise clear.  Negative for pleural or pericardial effusions. The distal esophagus is normal.  Negative for coronary artery calcifications.    ABDOMEN: Fatty infiltration of the liver with fatty sparing along the gallbladder fossa.  Stable 1.2 cm nonobstructing inferior pole left renal stone.  Stable 1 cm exophytic hyperdense nodule within the inferior left kidney.  Subcentimeter superior pole right renal cyst.  The liver, spleen and gallbladder otherwise appear normal.  The pancreas is unremarkable.  Kidneys and adrenal glands are otherwise mild normal.    Negative for adenopathy, free fluid or inflammatory change noted within the abdomen or pelvis.  Vascular calcifications are present without aneurysmal changes.    The bowel appears normal. Appropriate  stool.  Negative for dilated loops of large or small bowel.  Normal appendix.  Negative for free air.    PELVIS: The urinary bladder is unremarkable.    The male pelvic organs are normal. There are pelvic phleboliths.    There is laxity of the linea alba.  There are sites of injection within the anterior abdominal wall.  The abdominal wall is otherwise intact.    No significant osseous abnormality is identified.  Negative for significant spinal canal stenosis. Stable multilevel marginal spondylosis.    Negative for groin adenopathy.  Impression: 1.  Negative for acute process involving the abdomen or pelvis.  Normal appendix.  Negative for obstructing renal stones or hydronephrosis.  Negative for free air.  Negative for inflammatory changes.    2. Numerous stable findings as noted above.  This includes a 1.2 cm nonobstructing inferior pole left renal stone, and 1 cm hyperdense cyst in the left kidney.    All CT scans at this facility are performed  using dose modulation techniques as appropriate to performed exam including the following:  automated exposure control; adjustment of mA and/or kV according to the patients size (this includes techniques or standardized protocols for targeted exams where dose is matched to indication/reason for exam: i.e. extremities or head);  iterative reconstruction technique.    Electronically signed by: Andrea Delgado MD  Date:    01/30/2025  Time:    21:26            Assessment/Plan:       Will get PFT, 6mwd, and HRCT; office visit following to review    Problem List Items Addressed This Visit    None  Visit Diagnoses       Mixed restrictive and obstructive lung disease    -  Primary    Relevant Orders    Complete PFT with bronchodilator    Stress test, pulmonary    CT Chest Without Contrast            Follow up for PFT, walk, Chest CT and f/u physician next available.    Discussed diagnosis, its evaluation, treatment and usual course. All questions answered.    Patient verbalized  understanding of plan and left in no acute distress    Thank you for the courtesy of participating in the care of this patient    Elizabeth G Blough, PA-C [facility]

## 2025-02-21 ENCOUNTER — HOSPITAL ENCOUNTER (OUTPATIENT)
Dept: RADIOLOGY | Facility: HOSPITAL | Age: 55
Discharge: HOME | End: 2025-02-21
Attending: PHYSICIAN ASSISTANT
Payer: COMMERCIAL

## 2025-02-21 DIAGNOSIS — J98.4 MIXED RESTRICTIVE AND OBSTRUCTIVE LUNG DISEASE: ICD-10-CM

## 2025-02-21 DIAGNOSIS — J43.9 MIXED RESTRICTIVE AND OBSTRUCTIVE LUNG DISEASE: ICD-10-CM

## 2025-02-21 PROCEDURE — 71250 CT THORAX DX C-: CPT | Mod: 26,,, | Performed by: STUDENT IN AN ORGANIZED HEALTH CARE EDUCATION/TRAINING PROGRAM

## 2025-02-21 PROCEDURE — 71250 CT THORAX DX C-: CPT | Mod: TC,PO

## 2025-02-28 ENCOUNTER — CLINICAL SUPPORT (OUTPATIENT)
Dept: PULMONOLOGY | Facility: CLINIC | Age: 55
End: 2025-02-28
Payer: COMMERCIAL

## 2025-02-28 ENCOUNTER — OFFICE VISIT (OUTPATIENT)
Dept: PULMONOLOGY | Facility: CLINIC | Age: 55
End: 2025-02-28
Payer: COMMERCIAL

## 2025-02-28 VITALS
BODY MASS INDEX: 28.97 KG/M2 | HEIGHT: 74 IN | DIASTOLIC BLOOD PRESSURE: 87 MMHG | SYSTOLIC BLOOD PRESSURE: 142 MMHG | OXYGEN SATURATION: 98 % | WEIGHT: 225.75 LBS | WEIGHT: 225.75 LBS | HEIGHT: 74 IN | RESPIRATION RATE: 18 BRPM | HEART RATE: 94 BPM | BODY MASS INDEX: 28.97 KG/M2

## 2025-02-28 DIAGNOSIS — J43.9 MIXED RESTRICTIVE AND OBSTRUCTIVE LUNG DISEASE: ICD-10-CM

## 2025-02-28 DIAGNOSIS — J43.9 MIXED RESTRICTIVE AND OBSTRUCTIVE LUNG DISEASE: Primary | ICD-10-CM

## 2025-02-28 DIAGNOSIS — J98.4 MIXED RESTRICTIVE AND OBSTRUCTIVE LUNG DISEASE: ICD-10-CM

## 2025-02-28 DIAGNOSIS — J98.4 MIXED RESTRICTIVE AND OBSTRUCTIVE LUNG DISEASE: Primary | ICD-10-CM

## 2025-02-28 LAB
BRPFT: NORMAL
DLCO ADJ PRE: 18.58 ML/(MIN*MMHG)
DLCO SINGLE BREATH LLN: 26.4
DLCO SINGLE BREATH PRE REF: 55.7 %
DLCO SINGLE BREATH REF: 33.33
DLCOC SBVA LLN: 3.13
DLCOC SBVA PRE REF: 98.8 %
DLCOC SBVA REF: 4.2
DLCOC SINGLE BREATH LLN: 26.4
DLCOC SINGLE BREATH PRE REF: 55.7 %
DLCOC SINGLE BREATH REF: 33.33
DLCOVA LLN: 3.13
DLCOVA PRE REF: 98.8 %
DLCOVA PRE: 4.15 ML/(MIN*MMHG*L)
DLCOVA REF: 4.2
DLVAADJ PRE: 4.15 ML/(MIN*MMHG*L)
ERV LLN: -16448.65
ERV PRE REF: 60.7 %
ERV REF: 1.35
FEF 25 75 CHG: 26.4 %
FEF 25 75 LLN: 2.23
FEF 25 75 POST REF: 35.7 %
FEF 25 75 PRE REF: 28.2 %
FEF 25 75 REF: 3.94
FET100 CHG: -15.7 %
FEV1 CHG: 9.5 %
FEV1 FVC CHG: 8.3 %
FEV1 FVC LLN: 67
FEV1 FVC POST REF: 81 %
FEV1 FVC PRE REF: 74.8 %
FEV1 FVC REF: 78
FEV1 LLN: 2.65
FEV1 POST REF: 61.2 %
FEV1 PRE REF: 55.9 %
FEV1 REF: 3.59
FRCPLETH LLN: 2.83
FRCPLETH PREREF: 94 %
FRCPLETH REF: 3.81
FVC CHG: 1.1 %
FVC LLN: 3.47
FVC POST REF: 75.4 %
FVC PRE REF: 74.5 %
FVC REF: 4.62
IVC PRE: 2.7 L
IVC SINGLE BREATH LLN: 3.47
IVC SINGLE BREATH PRE REF: 58.5 %
IVC SINGLE BREATH REF: 4.62
MVV LLN: 135
MVV PRE REF: 55.4 %
MVV REF: 159
PEF CHG: 17 %
PEF LLN: 6.63
PEF POST REF: 49.9 %
PEF PRE REF: 42.7 %
PEF REF: 9.6
POST FEF 25 75: 1.41 L/S
POST FET 100: 7.57 SEC
POST FEV1 FVC: 63.15 %
POST FEV1: 2.2 L
POST FVC: 3.48 L
POST PEF: 4.79 L/S
PRE DLCO: 18.58 ML/(MIN*MMHG)
PRE ERV: 0.82 L
PRE FEF 25 75: 1.11 L/S
PRE FET 100: 8.98 SEC
PRE FEV1 FVC: 58.3 %
PRE FEV1: 2.01 L
PRE FRC PL: 3.58 L
PRE FVC: 3.44 L
PRE MVV: 88 L/MIN
PRE PEF: 4.1 L/S
PRE RV: 2.88 L
PRE TLC: 6.41 L
RAW LLN: 3.06
RAW PRE REF: 65.1 %
RAW PRE: 1.99 CMH2O*S/L
RAW REF: 3.06
RV LLN: 1.79
RV PRE REF: 117 %
RV REF: 2.46
RVTLC LLN: 27
RVTLC PRE REF: 125.6 %
RVTLC PRE: 44.98 %
RVTLC REF: 36
TLC LLN: 6.79
TLC PRE REF: 80.8 %
TLC REF: 7.94
VA PRE: 4.5 L
VA SINGLE BREATH LLN: 7.79
VA SINGLE BREATH PRE REF: 57.7 %
VA SINGLE BREATH REF: 7.79
VC LLN: 3.47
VC PRE REF: 76.4 %
VC PRE: 3.53 L
VC REF: 4.62
VTGRAWPRE: 3.51 L

## 2025-02-28 PROCEDURE — 99999 PR PBB SHADOW E&M-EST. PATIENT-LVL II: CPT | Mod: PBBFAC,,,

## 2025-02-28 PROCEDURE — 99999 PR PBB SHADOW E&M-EST. PATIENT-LVL V: CPT | Mod: PBBFAC,,, | Performed by: STUDENT IN AN ORGANIZED HEALTH CARE EDUCATION/TRAINING PROGRAM

## 2025-02-28 PROCEDURE — 99999 PR PBB SHADOW E&M-EST. PATIENT-LVL I: CPT | Mod: PBBFAC,,,

## 2025-02-28 PROCEDURE — 3077F SYST BP >= 140 MM HG: CPT | Mod: CPTII,S$GLB,, | Performed by: STUDENT IN AN ORGANIZED HEALTH CARE EDUCATION/TRAINING PROGRAM

## 2025-02-28 PROCEDURE — 3008F BODY MASS INDEX DOCD: CPT | Mod: CPTII,S$GLB,, | Performed by: STUDENT IN AN ORGANIZED HEALTH CARE EDUCATION/TRAINING PROGRAM

## 2025-02-28 PROCEDURE — 99214 OFFICE O/P EST MOD 30 MIN: CPT | Mod: 25,S$GLB,, | Performed by: STUDENT IN AN ORGANIZED HEALTH CARE EDUCATION/TRAINING PROGRAM

## 2025-02-28 PROCEDURE — 3079F DIAST BP 80-89 MM HG: CPT | Mod: CPTII,S$GLB,, | Performed by: STUDENT IN AN ORGANIZED HEALTH CARE EDUCATION/TRAINING PROGRAM

## 2025-02-28 PROCEDURE — 1159F MED LIST DOCD IN RCRD: CPT | Mod: CPTII,S$GLB,, | Performed by: STUDENT IN AN ORGANIZED HEALTH CARE EDUCATION/TRAINING PROGRAM

## 2025-02-28 PROCEDURE — 3046F HEMOGLOBIN A1C LEVEL >9.0%: CPT | Mod: CPTII,S$GLB,, | Performed by: STUDENT IN AN ORGANIZED HEALTH CARE EDUCATION/TRAINING PROGRAM

## 2025-02-28 RX ORDER — LANCETS
EACH MISCELLANEOUS
COMMUNITY
Start: 2024-12-04

## 2025-02-28 RX ORDER — DEXTROSE 4 G
TABLET,CHEWABLE ORAL
COMMUNITY
Start: 2024-10-31

## 2025-02-28 NOTE — PROCEDURES
"[facility]  Six Minute Walk     SUMMARY     Ordering Provider: MARIE De León   Interpreting Provider:   Performing nurse/tech/RT: CASANDRA Steve RRT  Diagnosis:  (Mixed Obstructive and Restrictive Lung Disease)  Height: 6' 2" (188 cm)  Weight: 102.4 kg (225 lb 12 oz)  BMI (Calculated): 29                Phase Oxygen Assessment Supplemental O2 Heart   Rate Blood Pressure Carlos Dyspnea Scale Rating   Resting 98 % Room Air 94 bpm 142/87 0   Exercise        Minute        1 96 % Room Air 100 bpm     2 96 % Room Air 107 bpm     3 95 % Room Air 104 bpm     4 93 % Room Air 106 bpm     5 94 % Room Air 103 bpm     6  94 % Room Air 106 bpm 147/85 0   Recovery        Minute        1 97 % Room Air 101 bpm     2 98 % Room Air 90 bpm     3 98 % Room Air 91 bpm     4 98 % Room Air 88 bpm   0     Six Minute Walk Summary  6MWT Status: completed without stopping  Patient Reported: No complaints     Interpretation:  Did the patient stop or pause?: No                                         Total Time Walked (Calculated): 360 seconds  Final Partial Lap Distance (feet): 0 feet  Total Distance Meters (Calculated): 426.72 meters  Predicted Distance Meters (Calculated): 652.82 meters  Percentage of Predicted (Calculated): 65.37  Peak VO2 (Calculated): 16.78  Mets: 4.79  Has The Patient Had a Previous Six Minute Walk Test?: No       Previous 6MWT Results  Has The Patient Had a Previous Six Minute Walk Test?: No    "

## 2025-02-28 NOTE — PROGRESS NOTES
"Chief complaint:  Chief Complaint   Patient presents with    Cough        History of present illness -  [facility] comes to clinic today for abnormal spirometry.  Had been seen in the past by MsBenito Martinezjoann for the same reason.  He is asymptomatic from a respiratory standpoint, the only reason why spirometry was ordered is because the patient works at a plant and as part of his yearly physical he needs to get tested for it.  Denies a history of asthma, allergies, COPD, any sort of smoking including vaping.  No other identifiable exposure apart from his usual work at the plant for which he uses the safely standards and wears a respirator when exposed to potential matter that could aerosolize.    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  But was an  at a different plant before.      Physical examination -   Physical Exam  Vitals and nursing note reviewed.   Constitutional:       Appearance: Normal appearance.   HENT:      Head: Normocephalic and atraumatic.      Nose: Nose normal.      Mouth/Throat:      Mouth: Mucous membranes are moist.   Eyes:      Extraocular Movements: Extraocular movements intact.      Pupils: Pupils are equal, round, and reactive to light.   Cardiovascular:      Rate and Rhythm: Normal rate and regular rhythm.   Pulmonary:      Effort: Pulmonary effort is normal.      Breath sounds: Normal breath sounds.   Abdominal:      General: Abdomen is flat. Bowel sounds are normal.      Palpations: Abdomen is soft.   Musculoskeletal:         General: No swelling.   Skin:     General: Skin is warm.      Capillary Refill: Capillary refill takes less than 2 seconds.   Neurological:      General: No focal deficit present.      Mental Status: He is alert.        Vitals:    02/28/25 1526   BP: (!) 142/87   Pulse: 94   Resp: 18   SpO2: 98%   Weight: 102.4 kg (225 lb 12 oz)   Height: 6' 2" (1.88 m)    "   Review of Systems   Constitutional: Negative.    HENT: Negative.     Eyes: Negative.    Respiratory: Negative.     Cardiovascular: Negative.    Gastrointestinal: Negative.    Musculoskeletal: Negative.    Skin: Negative.    Neurological: Negative.        Relevant data (Independently reviewed by me) -    Prior notes -   Pulmonary    Labs -   Apart from mild CKD and hyperglycemia chemistries were normal.  White count is 8.3 eosinophil level is 1%    Spirometry -  02/28/2025 spirometry was consistent with dominant obstructive physiology.  See report for full details    Imaging -   02/21/2025 CT scan of the chest showed no emphysema, no airway thickening, the only abnormality was a small thickening in the left lung base perhaps scarring or old atelectasis, patient denies ever having any chest interventions including surgery or thoracentesis    Assessment & Plan    Mixed restrictive and obstructive lung disease  -     Complete PFT with bronchodilator; Future; Expected date: 02/02/2026    Puzzling case of a patient with mixed restrictive and obstructive physiology on spirometry and body plethysmography, he is completely asymptomatic.      A few things come to mind it is not uncommon for patients who have T2 low asthma unawareness with perhaps many years of uncontrolled asthma that could degenerate into a fixed obstructive defect.    Pneumoconiosis or work-related disease could also be entertained however with a quasi normal CT scan of the chest this is hard to sustain.     Regardless of the etiology that seems to be elusive at the moment the patient's quality of life as well as activities of daily living are not impaired at all from a respiratory standpoint, up until recently 10-15 years ago he was able to play basketball with no limitations and was able to keep up with his peers.    He is to come back to clinic in about 1 year for repeat testing.  Or sooner if needed    Jose Atkinson  03/03/2025

## 2025-03-06 ENCOUNTER — OFFICE VISIT (OUTPATIENT)
Dept: INTERNAL MEDICINE | Facility: CLINIC | Age: 55
End: 2025-03-06
Payer: COMMERCIAL

## 2025-03-06 VITALS
HEIGHT: 74 IN | DIASTOLIC BLOOD PRESSURE: 84 MMHG | OXYGEN SATURATION: 98 % | TEMPERATURE: 97 F | SYSTOLIC BLOOD PRESSURE: 160 MMHG | BODY MASS INDEX: 29.37 KG/M2 | WEIGHT: 228.81 LBS | HEART RATE: 105 BPM

## 2025-03-06 DIAGNOSIS — E78.5 HYPERLIPIDEMIA, UNSPECIFIED HYPERLIPIDEMIA TYPE: ICD-10-CM

## 2025-03-06 DIAGNOSIS — J98.4 MIXED RESTRICTIVE AND OBSTRUCTIVE LUNG DISEASE: ICD-10-CM

## 2025-03-06 DIAGNOSIS — Z12.5 PROSTATE CANCER SCREENING: ICD-10-CM

## 2025-03-06 DIAGNOSIS — Z76.89 ENCOUNTER TO ESTABLISH CARE: ICD-10-CM

## 2025-03-06 DIAGNOSIS — N18.31 STAGE 3A CHRONIC KIDNEY DISEASE: Chronic | ICD-10-CM

## 2025-03-06 DIAGNOSIS — I10 ESSENTIAL HYPERTENSION: Primary | Chronic | ICD-10-CM

## 2025-03-06 DIAGNOSIS — E11.9 DIABETES MELLITUS TYPE 2, INSULIN DEPENDENT: Chronic | ICD-10-CM

## 2025-03-06 DIAGNOSIS — N52.9 ERECTILE DYSFUNCTION, UNSPECIFIED ERECTILE DYSFUNCTION TYPE: ICD-10-CM

## 2025-03-06 DIAGNOSIS — J43.9 MIXED RESTRICTIVE AND OBSTRUCTIVE LUNG DISEASE: ICD-10-CM

## 2025-03-06 DIAGNOSIS — Z79.4 DIABETES MELLITUS TYPE 2, INSULIN DEPENDENT: Chronic | ICD-10-CM

## 2025-03-06 DIAGNOSIS — Z12.11 SCREENING FOR COLON CANCER: ICD-10-CM

## 2025-03-06 PROCEDURE — G2211 COMPLEX E/M VISIT ADD ON: HCPCS | Mod: S$GLB,,, | Performed by: STUDENT IN AN ORGANIZED HEALTH CARE EDUCATION/TRAINING PROGRAM

## 2025-03-06 PROCEDURE — 99214 OFFICE O/P EST MOD 30 MIN: CPT | Mod: S$GLB,,, | Performed by: STUDENT IN AN ORGANIZED HEALTH CARE EDUCATION/TRAINING PROGRAM

## 2025-03-06 PROCEDURE — 3008F BODY MASS INDEX DOCD: CPT | Mod: CPTII,S$GLB,, | Performed by: STUDENT IN AN ORGANIZED HEALTH CARE EDUCATION/TRAINING PROGRAM

## 2025-03-06 PROCEDURE — 1160F RVW MEDS BY RX/DR IN RCRD: CPT | Mod: CPTII,S$GLB,, | Performed by: STUDENT IN AN ORGANIZED HEALTH CARE EDUCATION/TRAINING PROGRAM

## 2025-03-06 PROCEDURE — 3079F DIAST BP 80-89 MM HG: CPT | Mod: CPTII,S$GLB,, | Performed by: STUDENT IN AN ORGANIZED HEALTH CARE EDUCATION/TRAINING PROGRAM

## 2025-03-06 PROCEDURE — 3077F SYST BP >= 140 MM HG: CPT | Mod: CPTII,S$GLB,, | Performed by: STUDENT IN AN ORGANIZED HEALTH CARE EDUCATION/TRAINING PROGRAM

## 2025-03-06 PROCEDURE — 4010F ACE/ARB THERAPY RXD/TAKEN: CPT | Mod: CPTII,S$GLB,, | Performed by: STUDENT IN AN ORGANIZED HEALTH CARE EDUCATION/TRAINING PROGRAM

## 2025-03-06 PROCEDURE — 3046F HEMOGLOBIN A1C LEVEL >9.0%: CPT | Mod: CPTII,S$GLB,, | Performed by: STUDENT IN AN ORGANIZED HEALTH CARE EDUCATION/TRAINING PROGRAM

## 2025-03-06 PROCEDURE — 1159F MED LIST DOCD IN RCRD: CPT | Mod: CPTII,S$GLB,, | Performed by: STUDENT IN AN ORGANIZED HEALTH CARE EDUCATION/TRAINING PROGRAM

## 2025-03-06 PROCEDURE — 99999 PR PBB SHADOW E&M-EST. PATIENT-LVL V: CPT | Mod: PBBFAC,,, | Performed by: STUDENT IN AN ORGANIZED HEALTH CARE EDUCATION/TRAINING PROGRAM

## 2025-03-06 RX ORDER — AMLODIPINE BESYLATE 10 MG/1
10 TABLET ORAL
COMMUNITY
Start: 2025-03-01

## 2025-03-06 NOTE — PROGRESS NOTES
Chief Complaint   Patient presents with    Establish Care     HPI: Iftikhar Dickson Burns is a 54 y.o. male  with Pmhx listed below who presents to clinic for    History of Present Illness    CHIEF COMPLAINT:  - Mr. Burns presents for follow-up of his diabetes and to establish care with a new provider due to insurance changes.    HPI:  Mr. Burns reports having diabetes for 6-7 years. He has been taking medication for his condition, including Lantus insulin at night, metformin twice daily, and a short-acting insulin. His diabetes is not well-controlled, with his most recent A1c on January 30 being greater than 14. His A1c has consistently been above target over the past 3 years, with values ranging from 8.8 to 14.    He also reports issues with hypertension. His blood pressure is elevated today at 160/90, despite taking his medication. His blood pressure is normally 120-130, with 160 being a rare occurrence. He uses a home blood pressure monitor but notes there may be some variation between his home readings and clinic measurements.    He reports a history of kidney stones. He was evaluated at the ER a few weeks ago due to bladder pain, where he was diagnosed with a UTI and given antibiotics. A CT during that visit also revealed a kidney stone, which he reports is not bothering him.    Regarding his diet, he reports recent changes. He has switched to wheat bread and has been eating more salads while avoiding starchy foods as per medical advice. He also mentions drinking a lot of water and unsweetened tea.    He reports frequent hunger, stating that he feels hungry again 10-15 minutes after eating a full meal. He also mentions a history of frequent urination, which has improved from every 5 minutes when he was first diagnosed with diabetes to every 2-3 hours now. He denies any current issues with urination frequency or volume.           Problem List:  Problem List[1]    ROS: Negative except as noted above.        Current Meds:  Current Medications[2]   PE:  BP: (!) 160/84  Pulse: 105     Temp: 97.2 °F (36.2 °C)  Weight: 103.8 kg (228 lb 13.4 oz) Body mass index is 29.38 kg/m².    Wt Readings from Last 5 Encounters:   03/06/25 103.8 kg (228 lb 13.4 oz)   02/28/25 102.4 kg (225 lb 12 oz)   02/28/25 102.4 kg (225 lb 12 oz)   02/10/25 103.4 kg (228 lb 1.1 oz)   01/30/25 99.5 kg (219 lb 4 oz)     General appearance: alert and cooperative, not in acute distress  Head: normocephalic, without obvious abnormality, atraumatic  Eyes: conjunctivae/corneas clear. PERRL, EOM's intact.  Ears: clear tympanic membranes   Neck: no adenopathy, supple, symmetrical, trachea midline and thyroid not enlarged, symmetric, no tenderness/mass/nodules, no JVD  Throat: lips, mucosa, and tongue normal; teeth and gums normal; no thrush  Chest: no reproducible chest pain   Heart: regular rate and rhythm, S1, S2 normal, no murmur, click, rub or gallop  Lungs: unlabored respiration, bilateral equal air entry, normal vesicular breath sound heard, no wheezing, rhonchi   Abdomen: soft, non-tender, non-distended; bowel sounds +; no masses,  no organomegaly, no ascites   Extremities: normal, atraumatic, no cyanosis or edema noted B/L upper and lower extremities.  Skin: skin color, texture, turgor normal. No rashes or lesions noted.  Neurologic: grossly intact      Lab:  Lab Results   Component Value Date    WBC 8.39 01/30/2025    HGB 13.2 (L) 01/30/2025    HCT 40.6 01/30/2025    MCV 79 (L) 01/30/2025     01/30/2025     (L) 01/30/2025    K 4.4 01/30/2025     01/30/2025    CO2 19 (L) 01/30/2025    BUN 24 (H) 01/30/2025     (HH) 01/30/2025    CALCIUM 9.5 01/30/2025    AST 20 01/30/2025    ALT 27 01/30/2025    CHOL 172 07/25/2024    HDL 26 (L) 07/25/2024    LDLCALC 92 07/25/2024    TRIG 324 (H) 07/25/2024    TSH 0.925 10/19/2023       Impression:    ICD-10-CM ICD-9-CM    1. Essential hypertension  I10 401.9 CBC Auto Differential       Comprehensive Metabolic Panel      2. Hyperlipidemia, unspecified hyperlipidemia type  E78.5 272.4 Lipid Panel      3. Stage 3a chronic kidney disease  N18.31 585.3       4. Diabetes mellitus type 2, insulin dependent  E11.9 250.00 Hemoglobin A1C    Z79.4 V58.67 TSH      Microalbumin/Creatinine Ratio, Urine      Ambulatory referral/consult to Diabetic Advanced Practice Providers (Medical Management)      Ambulatory referral/consult to Ophthalmology      5. Erectile dysfunction, unspecified erectile dysfunction type  N52.9 607.84       6. Prostate cancer screening  Z12.5 V76.44       7. Mixed restrictive and obstructive lung disease  J43.9 496     J98.4 518.89       8. Screening for colon cancer  Z12.11 V76.51 Ambulatory referral/consult to Endo Procedure       9. Encounter to establish care  Z76.89 V65.8 HIV 1/2 Ag/Ab (4th Gen)      Hepatitis C Antibody          Assessment & Plan    DIABETES:  - Assessed the patient's diabetes management, noting uncontrolled status with A1c >14.  - Explained the significance of A1c as a marker for long-term glucose control.  - Discussed the relationship between uncontrolled diabetes and symptoms like increased urination, thirst, and hunger.  - Continued current medication regimen: Lantus insulin at night, metformin twice daily, and short-acting insulin 3 times daily.  - Plan to review lab results and potentially adjust diabetes medications.  - Referred the patient to diabetes management for education and nutritional counseling.  - Provided dietary advice, including avoiding white foods, sauces, processed meats, and sugary items.  - Recommend whole wheat bread and drinking water or sugar-free tea.  - Scheduled a virtual visit with the diabetes management team.  - Continued Lantus insulin nightly and short-acting insulin 3 times daily.  - Continued metformin twice daily.  - Discussed dietary recommendations for diabetes management, including foods to avoid or limit.  -   Grant to continue efforts to reduce starchy foods and increase salad consumption.  - Mr. Burns to maintain current water intake.  - Mr. Burns to use provided list of foods to avoid when grocery shopping.    HYPERTENSION:  - Evaluated blood pressure control, finding elevated readings of 160 despite medication.  - Noted patient's report of normal readings between 120 to 130, with occasional spikes to 160.  - Instructed the patient to monitor blood pressure at home and record readings for 2 weeks.  - Advised the patient to bring blood pressure log to follow-up visit.  - Plan to review and potentially adjust treatment based on home monitoring results.      URINARY TRACT INFECTION (UTI):  - Noted recent UTI diagnosis in the ER through urinalysis.  - Prescribed ciprofloxacin for the UTI treatment.      COLONOSCOPY FOLLOW-UP:  - Noted patient's last colonoscopy on January 23, 2019, where 1 polyps were found.  - Referred the patient for a follow-up colonoscopy   - Instructed the patient to arrange transportation due to anesthesia.    LABS:  - Ordered fasting lab work, including cholesterol panel.    FOLLOW UP:  - Instructed the patient to follow up in 2 weeks on a Friday.  - Advised the patient to contact the office for lab results and potential medication changes.          1. Hyperlipidemia, unspecified hyperlipidemia type  On fibrates and statin to be continued.  - Lipid Panel; Future    2. Stage 3a chronic kidney disease  - counseled on increase water intake at least 3 litre of H20 to remain hydrated  - stay away from nephrotoxic drugs like Ibuprofen and NSAID  - Counseled on the need to control HTN and Blood glucose to prevent the disease progression  - low salt diet  - dietary modification: renal diet encouraged  Repeat renal function test today    3. Diabetes mellitus type 2, insulin dependent   Latest Reference Range & Units 10/19/23 12:37 04/04/24 10:32 07/25/24 13:11 01/30/25 22:27   Hemoglobin A1C External 4.0  - 5.6 % 11.3 (H) (E) 9.7 (H) (E) 10.4 (H) (E) >14.0 (H)   Estimated Avg Glucose 68 - 131 mg/dL    Unable to calculate   (H): Data is abnormally high  (E): External lab result  - Hemoglobin A1C; Future  - TSH; Future  On metformin 500 mg bid  On Lantuss 45 units QHS  On gabe log 23 units TID  - Microalbumin/Creatinine Ratio, Urine; Future  - Ambulatory referral/consult to Diabetic Advanced Practice Providers (Medical Management); Future  - Ambulatory referral/consult to Ophthalmology; Future    4. Essential hypertension (Primary)  Low salt diet.  Enouraged to increase in physical activity at least 30 minutes of brisk walking/day , 5 days a week  Advised weight loss    Advised for DASH diet - The Dietary Approaches to Stop Hypertension (DASH) is high in vegetables, fruits, low-fat dairy products, whole grains, poultry, fish, and nuts and low in sweets, sugar-sweetened beverages, and red meats   Stop smoking.  Limit caffeine intake  Limit alcohol intake <3/day for men and <2/day for women.  Advised to be compliant with medication.  Please monitor your blood pressure regularly at home   Return precaution provided  On lisinopril to be continued  Reports to have normal -130's at home  Bp log to be maintained    - CBC Auto Differential; Future  - Comprehensive Metabolic Panel; Future    5. Erectile dysfunction, unspecified erectile dysfunction type  Stable for now      6. Prostate cancer screening   Latest Reference Range & Units 03/19/20 10:12 04/08/21 11:37 04/14/22 11:52 10/19/23 12:37   PSA Total 0.0 - 4.0 ng/mL 0.72 (E) 1.2 (E) 0.8 (E) 1.0 (E)   (E): External lab result  Psa repeated today    7. Mixed restrictive and obstructive lung disease  Stable   Not having issues    8. Screening for colon cancer  - Ambulatory referral/consult to Endo Procedure ; Future    9. Encounter to establish care  - HIV 1/2 Ag/Ab (4th Gen); Future  - Hepatitis C Antibody; Future      Future Appointments   Date Time Provider  [facility]   3/7/2025  8:00 AM [facility] LABORATORY IB [facility] [place]   3/7/2025  8:10 AM [facility] SPECIMEN LABORATORY [facility] SPECLAB [place]   3/28/2025  8:20 AM Blanca Flores, DAVID-MARY CARMEN [facility] IM [place]   5/2/2025  9:00 AM Jamey Madrigal PA-C [facility] DIABETE [facility]   5/16/2025  2:30 PM Kasandra Mejia MD [facility] OPHTHAL [facility]   3/5/2026  9:00 AM PULMONARY LAB, [place] [facility] PULMFUN [facility]   Repeat lab  Bp log       I spent a total of 35 minutes on the day of the visit.This includes face to face time and non-face to face time preparing to see the patient (eg, review of tests), obtaining and/or reviewing separately obtained history, documenting clinical information in the electronic or other health record, independently interpreting results and communicating results to the patient/family/caregiver, or care coordinator.  Visit today included increased complexity associated with the care of the episodic problem   addressed and managing the longitudinal care of the patient due to the serious and/or complex managed problem(s) .     Gómez Cao MD    This note was generated with the assistance of ambient listening technology. Verbal consent was obtained by the patient and accompanying visitor(s) for the recording of patient appointment to facilitate this note. I attest to having reviewed and edited the generated note for accuracy, though some syntax or spelling errors may persist. Please contact the author of this note for any clarification.          [1]   Patient Active Problem List  Diagnosis    Diabetes mellitus    Hyperlipidemia    Right epididymitis    Renal dysfunction    Hyperglycemia    Diabetes mellitus type 2, insulin dependent    ED (erectile dysfunction)    Prostate cancer screening    Pure hypercholesterolemia    Stage 3a chronic kidney disease    Essential hypertension   [2]   Current Outpatient Medications   Medication Sig Dispense Refill    amLODIPine (NORVASC) 10 MG tablet Take  "10 mg by mouth.      BD ULTRA-FINE SHORT PEN NEEDLE 31 gauge x 5/16" Ndle Inject into the skin 4 (four) times daily.      blood sugar diagnostic (TRUE METRIX GLUCOSE TEST STRIP) Strp Check b/s TID      blood-glucose meter Misc Check b/s daily      fenofibrate (TRICOR) 48 MG tablet Take 48 mg by mouth.      insulin (LANTUS SOLOSTAR U-100 INSULIN) glargine 100 units/mL (3mL) SubQ pen Inject 45 Units into the skin.      insulin aspart U-100 (NOVOLOG) 100 unit/mL (3 mL) InPn pen Inject 23 Units into the skin 3 (three) times daily with meals.       lancets Misc Check b/s TID      lisinopriL (PRINIVIL,ZESTRIL) 20 MG tablet Take 20 mg by mouth.      metformin (GLUCOPHAGE) 500 MG tablet Take 500 mg by mouth 2 (two) times daily.      ondansetron (ZOFRAN-ODT) 4 MG TbDL Take 1 tablet (4 mg total) by mouth every 6 (six) hours as needed (nausea). 12 tablet 0    pen needle, diabetic 31 gauge x 1/4" Ndle Use QID with Lantus and Novolog      pravastatin (PRAVACHOL) 20 MG tablet Take 20 mg by mouth once daily.      tamsulosin (FLOMAX) 0.4 mg Cap Take 1 capsule (0.4 mg total) by mouth once daily. 30 capsule 0    aspirin 81 MG Chew Take 81 mg by mouth once daily.      cefdinir (OMNICEF) 300 MG capsule Take 300 mg by mouth 2 (two) times daily.      ketorolac (TORADOL) 10 mg tablet Take 1 tablet (10 mg total) by mouth every 8 (eight) hours as needed for Pain. 15 tablet 0    mupirocin (BACTROBAN) 2 % ointment Apply topically to affected area three times daily. 22 g 0     No current facility-administered medications for this visit.     "

## 2025-03-07 ENCOUNTER — LAB VISIT (OUTPATIENT)
Dept: LAB | Facility: HOSPITAL | Age: 55
End: 2025-03-07
Attending: STUDENT IN AN ORGANIZED HEALTH CARE EDUCATION/TRAINING PROGRAM
Payer: COMMERCIAL

## 2025-03-07 DIAGNOSIS — E11.9 DIABETES MELLITUS TYPE 2, INSULIN DEPENDENT: Chronic | ICD-10-CM

## 2025-03-07 DIAGNOSIS — Z79.4 DIABETES MELLITUS TYPE 2, INSULIN DEPENDENT: Chronic | ICD-10-CM

## 2025-03-07 PROCEDURE — 82570 ASSAY OF URINE CREATININE: CPT | Performed by: STUDENT IN AN ORGANIZED HEALTH CARE EDUCATION/TRAINING PROGRAM

## 2025-03-08 LAB
ALBUMIN/CREAT UR: 122.9 UG/MG (ref 0–30)
CREAT UR-MCNC: 118 MG/DL (ref 23–375)
MICROALBUMIN UR DL<=1MG/L-MCNC: 145 UG/ML

## 2025-03-09 ENCOUNTER — RESULTS FOLLOW-UP (OUTPATIENT)
Dept: INTERNAL MEDICINE | Facility: CLINIC | Age: 55
End: 2025-03-09

## 2025-03-14 ENCOUNTER — HOSPITAL ENCOUNTER (OUTPATIENT)
Dept: PREADMISSION TESTING | Facility: HOSPITAL | Age: 55
Discharge: HOME | End: 2025-03-14
Attending: COLON & RECTAL SURGERY
Payer: COMMERCIAL

## 2025-03-14 DIAGNOSIS — Z12.11 SCREENING FOR COLON CANCER: Primary | ICD-10-CM

## 2025-03-14 RX ORDER — POLYETHYLENE GLYCOL 3350, SODIUM SULFATE ANHYDROUS, SODIUM BICARBONATE, SODIUM CHLORIDE, POTASSIUM CHLORIDE 236; 22.74; 6.74; 5.86; 2.97 G/4L; G/4L; G/4L; G/4L; G/4L
4 POWDER, FOR SOLUTION ORAL ONCE
Qty: 4000 ML | Refills: 0 | Status: SHIPPED | OUTPATIENT
Start: 2025-03-14 | End: 2025-03-14

## 2025-04-02 ENCOUNTER — NURSE TRIAGE (OUTPATIENT)
Dept: ADMINISTRATIVE | Facility: CLINIC | Age: 55
End: 2025-04-02
Payer: COMMERCIAL

## 2025-04-02 NOTE — TELEPHONE ENCOUNTER
Spoke with wife of pt who reports pt is scheduled for colonoscopy Friday. Calling in to confirm what time pt needs to arrive for.  Informed per chart, that procedure time is 10 AM, but pt would need to arrive atleast an hour before. Wife states that according to instructions arrival time is 8:30.   Reason for Disposition   Question about upcoming scheduled surgery, procedure or test, no triage required, and triager able to answer question    Protocols used: Information Only Call - No Triage-A-

## 2025-04-04 ENCOUNTER — HOSPITAL ENCOUNTER (OUTPATIENT)
Dept: ENDOSCOPY | Facility: HOSPITAL | Age: 55
Discharge: HOME | End: 2025-04-04
Attending: STUDENT IN AN ORGANIZED HEALTH CARE EDUCATION/TRAINING PROGRAM | Admitting: COLON & RECTAL SURGERY
Payer: COMMERCIAL

## 2025-04-04 ENCOUNTER — ANESTHESIA (OUTPATIENT)
Dept: ENDOSCOPY | Facility: HOSPITAL | Age: 55
End: 2025-04-04
Payer: COMMERCIAL

## 2025-04-04 ENCOUNTER — ANESTHESIA EVENT (OUTPATIENT)
Dept: ENDOSCOPY | Facility: HOSPITAL | Age: 55
End: 2025-04-04
Payer: COMMERCIAL

## 2025-04-04 VITALS
RESPIRATION RATE: 18 BRPM | SYSTOLIC BLOOD PRESSURE: 129 MMHG | BODY MASS INDEX: 28.88 KG/M2 | TEMPERATURE: 98 F | HEART RATE: 80 BPM | DIASTOLIC BLOOD PRESSURE: 80 MMHG | OXYGEN SATURATION: 95 % | WEIGHT: 225 LBS | HEIGHT: 74 IN

## 2025-04-04 DIAGNOSIS — Z12.11 SCREENING FOR COLON CANCER: ICD-10-CM

## 2025-04-04 LAB — POCT GLUCOSE: 193 MG/DL (ref 70–110)

## 2025-04-04 PROCEDURE — 37000009 HC ANESTHESIA EA ADD 15 MINS

## 2025-04-04 PROCEDURE — 88305 TISSUE EXAM BY PATHOLOGIST: CPT | Mod: TC,91 | Performed by: COLON & RECTAL SURGERY

## 2025-04-04 PROCEDURE — 63600175 PHARM REV CODE 636 W HCPCS

## 2025-04-04 PROCEDURE — 45380 COLONOSCOPY AND BIOPSY: CPT | Mod: 33,59 | Performed by: COLON & RECTAL SURGERY

## 2025-04-04 PROCEDURE — 27201012 HC FORCEPS, HOT/COLD, DISP: Performed by: COLON & RECTAL SURGERY

## 2025-04-04 PROCEDURE — 25000003 PHARM REV CODE 250: Performed by: COLON & RECTAL SURGERY

## 2025-04-04 PROCEDURE — 45380 COLONOSCOPY AND BIOPSY: CPT | Mod: 33,XS,, | Performed by: COLON & RECTAL SURGERY

## 2025-04-04 PROCEDURE — 27201089 HC SNARE, DISP (ANY): Performed by: COLON & RECTAL SURGERY

## 2025-04-04 PROCEDURE — 45385 COLONOSCOPY W/LESION REMOVAL: CPT | Mod: 33,,, | Performed by: COLON & RECTAL SURGERY

## 2025-04-04 PROCEDURE — 37000008 HC ANESTHESIA 1ST 15 MINUTES

## 2025-04-04 PROCEDURE — 45385 COLONOSCOPY W/LESION REMOVAL: CPT | Mod: 33 | Performed by: COLON & RECTAL SURGERY

## 2025-04-04 RX ORDER — PROPOFOL 10 MG/ML
VIAL (ML) INTRAVENOUS
Status: DISCONTINUED | OUTPATIENT
Start: 2025-04-04 | End: 2025-04-04

## 2025-04-04 RX ORDER — ONDANSETRON HYDROCHLORIDE 2 MG/ML
INJECTION, SOLUTION INTRAVENOUS
Status: DISCONTINUED | OUTPATIENT
Start: 2025-04-04 | End: 2025-04-04

## 2025-04-04 RX ORDER — LIDOCAINE HYDROCHLORIDE 10 MG/ML
INJECTION, SOLUTION EPIDURAL; INFILTRATION; INTRACAUDAL; PERINEURAL
Status: DISCONTINUED | OUTPATIENT
Start: 2025-04-04 | End: 2025-04-04

## 2025-04-04 RX ORDER — DEXTROMETHORPHAN/PSEUDOEPHED 2.5-7.5/.8
DROPS ORAL
Status: COMPLETED | OUTPATIENT
Start: 2025-04-04 | End: 2025-04-04

## 2025-04-04 RX ADMIN — PROPOFOL 40 MG: 10 INJECTION, EMULSION INTRAVENOUS at 10:04

## 2025-04-04 RX ADMIN — PROPOFOL 80 MG: 10 INJECTION, EMULSION INTRAVENOUS at 09:04

## 2025-04-04 RX ADMIN — PROPOFOL 30 MG: 10 INJECTION, EMULSION INTRAVENOUS at 09:04

## 2025-04-04 RX ADMIN — PROPOFOL 60 MG: 10 INJECTION, EMULSION INTRAVENOUS at 10:04

## 2025-04-04 RX ADMIN — SIMETHICONE 200 MG: 20 SUSPENSION/ DROPS ORAL at 09:04

## 2025-04-04 RX ADMIN — LIDOCAINE HYDROCHLORIDE 50 MG: 10 SOLUTION INTRAVENOUS at 09:04

## 2025-04-04 RX ADMIN — PROPOFOL 50 MG: 10 INJECTION, EMULSION INTRAVENOUS at 10:04

## 2025-04-04 RX ADMIN — ONDANSETRON 4 MG: 2 INJECTION INTRAMUSCULAR; INTRAVENOUS at 09:04

## 2025-04-04 RX ADMIN — PROPOFOL 50 MG: 10 INJECTION, EMULSION INTRAVENOUS at 09:04

## 2025-04-04 NOTE — ANESTHESIA PREPROCEDURE EVALUATION
04/04/2025  Iftikhar Dickson Burns is a 54 y.o., male.    Problem List[1]  Past Medical History:   Diagnosis Date    Diabetes mellitus     High cholesterol     Kidney stones      Past Surgical History:   Procedure Laterality Date    NO PAST SURGERIES         Pre-op Assessment    I have reviewed the Patient Summary Reports.     I have reviewed the Nursing Notes. I have reviewed the NPO Status.   I have reviewed the Medications.     Review of Systems  Anesthesia Hx:               Denies Personal Hx of Anesthesia complications.                    Social:  Non-Smoker, No Alcohol Use       Cardiovascular:     Hypertension           hyperlipidemia                               Renal/:  Chronic Renal Disease, CKD                Hepatic/GI:  Bowel Prep.                   Endocrine:  Diabetes                 Physical Exam  General: Well nourished, Cooperative, Alert and Oriented    Airway:  Mallampati: II   Mouth Opening: Normal  TM Distance: Normal  Tongue: Normal  Neck ROM: Normal ROM    Chest/Lungs:  Normal Respiratory Rate    Heart:  Rate: Normal  Rhythm: Regular Rhythm        Anesthesia Plan  Type of Anesthesia, risks & benefits discussed:    Anesthesia Type: MAC, Gen Natural Airway  Intra-op Monitoring Plan: Standard ASA Monitors  Induction:  IV  Informed Consent: Informed consent signed with the Patient and all parties understand the risks and agree with anesthesia plan.  All questions answered.   ASA Score: 2  Day of Surgery Review of History & Physical: H&P Update referred to the surgeon/provider.    Ready For Surgery From Anesthesia Perspective.     .           [1]   Patient Active Problem List  Diagnosis    Diabetes mellitus    Hyperlipidemia    Right epididymitis    Renal dysfunction    Hyperglycemia    Diabetes mellitus type 2, insulin dependent    ED (erectile dysfunction)    Prostate cancer screening     Pure hypercholesterolemia    Stage 3a chronic kidney disease    Essential hypertension

## 2025-04-04 NOTE — TRANSFER OF CARE
"Anesthesia Transfer of Care Note    Patient: Iftikhar Burns    Procedure(s) Performed: * No procedures listed *    Patient location: GI    Anesthesia Type: MAC    Transport from OR: Transported from OR on room air with adequate spontaneous ventilation    Post pain: adequate analgesia    Post assessment: no apparent anesthetic complications    Post vital signs: stable    Level of consciousness: responds to stimulation    Nausea/Vomiting: no nausea/vomiting    Complications: none    Transfer of care protocol was followed      Last vitals: Visit Vitals  /67 (BP Location: Left arm, Patient Position: Lying)   Pulse 82   Temp 36.8 °C (98.2 °F) (Temporal)   Resp 18   Ht 6' 2" (1.88 m)   Wt 102.1 kg (225 lb)   SpO2 96%   BMI 28.89 kg/m²     "

## 2025-04-04 NOTE — BRIEF OP NOTE
[facility] - Endoscopy (Hospital)  Brief Operative Note     SUMMARY     Surgery Date: 4/4/2025     Surgeon: Saad Mera MD    Pre-op Diagnosis:  Screen for Colon Cancer    Post-op Diagnosis:  Screen for Colon Cancer    Procedure: Colonoscopy    Anesthesia: MAC    Description of the findings of the procedure: multiple polyps removed    Estimated Blood Loss: minimal         Specimens:   Specimen (24h ago, onward)       Start     Ordered    04/04/25 1001  Specimen to Pathology Gastrointestinal tract  RELEASE UPON ORDERING        References:    Click here for ordering Quick Tip   Question:  Release to patient  Answer:  Immediate    04/04/25 1001

## 2025-04-04 NOTE — H&P
"[facility] ([facility])  Colon and Rectal Surgery  History & Physical    Patient Name: Iftikhar Burns  MRN: [id number]  Admission Date: 4/4/2025  Attending Physician: Saad Mera MD  Primary Care Provider: Gómez Cao MD    Patient information was obtained from patient and medical records.    Subjective:     Chief Complaint/Reason for Admission: Here for Colonoscopy    History of Present Illness:  Patient is a 54 y.o. male presents for colonoscopy. Reports last colonoscopy 5-6yrs ago with one polyp removed. No hematochezia, melena or change in bowel habits. No fam hx of CRC or polyps.    Current Outpatient Medications on File Prior to Encounter   Medication Sig    amLODIPine (NORVASC) 10 MG tablet Take 10 mg by mouth once daily.    aspirin 81 MG Chew Take 81 mg by mouth once daily.    BD ULTRA-FINE SHORT PEN NEEDLE 31 gauge x 5/16" Ndle Inject into the skin 4 (four) times daily.    blood sugar diagnostic (TRUE METRIX GLUCOSE TEST STRIP) Strp Check b/s TID    blood-glucose meter Misc Check b/s daily    insulin (LANTUS SOLOSTAR U-100 INSULIN) glargine 100 units/mL (3mL) SubQ pen Inject 50 Units into the skin every evening.    insulin aspart U-100 (NOVOLOG) 100 unit/mL (3 mL) InPn pen Inject 23 Units into the skin 3 (three) times daily with meals.     lisinopriL (PRINIVIL,ZESTRIL) 20 MG tablet Take 20 mg by mouth once daily.    pen needle, diabetic 31 gauge x 1/4" Ndle Use QID with Lantus and Novolog    pravastatin (PRAVACHOL) 20 MG tablet Take 20 mg by mouth once daily.    cefdinir (OMNICEF) 300 MG capsule Take 300 mg by mouth 2 (two) times a day.    fenofibrate (TRICOR) 48 MG tablet Take 48 mg by mouth. (Patient not taking: Reported on 3/28/2025)    ketorolac (TORADOL) 10 mg tablet Take 1 tablet (10 mg total) by mouth every 8 (eight) hours as needed for Pain.    lancets Misc Check b/s TID    metformin (GLUCOPHAGE) 500 MG tablet Take 500 mg by mouth 2 (two) times daily. (Patient not taking: " Reported on 3/28/2025)    mupirocin (BACTROBAN) 2 % ointment Apply topically to affected area three times daily. (Patient not taking: Reported on 3/28/2025)    ondansetron (ZOFRAN-ODT) 4 MG TbDL Take 1 tablet (4 mg total) by mouth every 6 (six) hours as needed (nausea). (Patient not taking: Reported on 3/28/2025)    tamsulosin (FLOMAX) 0.4 mg Cap Take 1 capsule (0.4 mg total) by mouth once daily. (Patient not taking: Reported on 3/28/2025)     No current facility-administered medications on file prior to encounter.       Review of patient's allergies indicates:  No Known Allergies    Past Medical History:   Diagnosis Date    Diabetes mellitus     High cholesterol     Kidney stones      Past Surgical History:   Procedure Laterality Date    NO PAST SURGERIES       Family History       Problem Relation (Age of Onset)    Cancer Father    Diabetes Mother          Tobacco Use    Smoking status: Never    Smokeless tobacco: Never   Substance and Sexual Activity    Alcohol use: No    Drug use: No    Sexual activity: Not on file     Review of Systems   Constitutional:  Negative for activity change, appetite change, chills, fatigue, fever and unexpected weight change.   HENT:  Negative for congestion, ear pain, sore throat and trouble swallowing.    Eyes:  Negative for pain, redness and itching.   Respiratory:  Negative for cough, shortness of breath and wheezing.    Cardiovascular:  Negative for chest pain, palpitations and leg swelling.   Gastrointestinal:  Negative for abdominal distention, abdominal pain, anal bleeding, blood in stool, constipation, diarrhea, nausea, rectal pain and vomiting.   Endocrine: Negative for cold intolerance, heat intolerance and polyuria.   Genitourinary:  Negative for dysuria, flank pain, frequency and hematuria.   Musculoskeletal:  Negative for gait problem, joint swelling and neck pain.   Skin:  Negative for color change, rash and wound.   Allergic/Immunologic: Negative for environmental  allergies and immunocompromised state.   Neurological:  Negative for dizziness, speech difficulty, weakness and numbness.   Psychiatric/Behavioral:  Negative for agitation, confusion and hallucinations.      Objective:     Vital Signs (Most Recent):  Temp: 97.7 °F (36.5 °C) (04/04/25 0841)  Pulse: 75 (04/04/25 0841)  Resp: 20 (04/04/25 0841)  BP: 137/81 (04/04/25 0841)  SpO2: 98 % (04/04/25 0841) Vital Signs (24h Range):  Temp:  [97.7 °F (36.5 °C)] 97.7 °F (36.5 °C)  Pulse:  [75] 75  Resp:  [20] 20  SpO2:  [98 %] 98 %  BP: (137)/(81) 137/81     Weight: 102.1 kg (225 lb)  Body mass index is 28.89 kg/m².    Physical Exam  Constitutional:       Appearance: He is well-developed.   HENT:      Head: Normocephalic and atraumatic.   Eyes:      Conjunctiva/sclera: Conjunctivae normal.   Neck:      Thyroid: No thyromegaly.   Cardiovascular:      Rate and Rhythm: Normal rate and regular rhythm.   Pulmonary:      Effort: Pulmonary effort is normal. No respiratory distress.   Abdominal:      General: There is no distension.      Palpations: Abdomen is soft. There is no mass.      Tenderness: There is no abdominal tenderness.   Musculoskeletal:         General: No tenderness. Normal range of motion.      Cervical back: Normal range of motion.   Skin:     General: Skin is warm and dry.      Capillary Refill: Capillary refill takes less than 2 seconds.      Findings: No rash.   Neurological:      Mental Status: He is alert and oriented to person, place, and time.       Assessment/Plan:     Patient is a 54 y.o. male who presents for colonoscopy     - Ok to proceed to endoscopy suite for colonoscopy  - Consent obtained. All risks, benefits and alternatives fully explained to patient, including but not limited to bleeding, infection, perforation, and missed polyps. All questions appropriately answered to patient's satisfaction. Consent signed and placed on chart.    There are no hospital problems to display for this patient.    VTE  Risk Mitigation (From admission, onward)      None            Saad Mera MD  Colon and Rectal Surgery  [facility] ([facility])

## 2025-04-04 NOTE — ANESTHESIA POSTPROCEDURE EVALUATION
Anesthesia Post Evaluation    Patient: Iftikhar Burns    Procedure(s) Performed: * No procedures listed *    Final Anesthesia Type: MAC      Patient location during evaluation: GI PACU  Patient participation: Yes- Able to Participate  Level of consciousness: awake and alert  Post-procedure vital signs: reviewed and stable  Pain management: adequate  Airway patency: patent    PONV status at discharge: No PONV  Anesthetic complications: no      Cardiovascular status: blood pressure returned to baseline and hemodynamically stable  Respiratory status: unassisted, spontaneous ventilation and room air  Hydration status: euvolemic  Follow-up not needed.              Vitals Value Taken Time   /80 04/04/25 10:49   Temp 36.8 °C (98.2 °F) 04/04/25 10:19   Pulse 80 04/04/25 10:49   Resp 18 04/04/25 10:49   SpO2 95 % 04/04/25 10:49         Event Time   Out of Recovery 11:02:42         Pain/Elsy Score: Elsy Score: 10 (4/4/2025 10:49 AM)

## 2025-04-04 NOTE — LETTER
Iftikhar Dickson Burns  [address] [address] [address]    PEG (polyethylene glycol) Instructions for Colonoscopy Common Brands: Golytely, Colyte, Nulytely, Gavilyte, Trilyte    Date of procedure: Friday 04/04/2025   Your arrival time will be given to you prior to the day of your procedure.  Your procedure will be performed at [facility] ([facility] The hospital is located at [address] (off [facility]).        As soon as possible:     your prep from pharmacy and over the counter DULCOLAX LAXATIVE TABLETS, GAS-X.    Three (3) days before colonoscopy: Avoid high fiber foods such as whole wheat or whole grain breads or pasta, raw vegetables or fruit with peels, corn, beans, peas, lentils, nuts, seeds, and popcorn.    On the day before your procedure     What You CAN do:     You may have CLEAR LIQUIDS ONLY (Drink at least 16 glasses (8oz glass)-   see below for list.   Liquids That Are OK to Drink:    Water    Sports drinks (Gatorade, Power-Aid)    Coffee or tea (no cream or nondairy creamer)    Clear juices without pulp (apple, white grape)    Gelatin desserts (no fruit or toppings)    Clear soda (sprite, coke, ginger ale)    Chicken broth (until 12 midnight the night before procedure)      What You CANNOT do:      Do not EAT solid food, drink milk or anything colored red or purple.    Do not drink alcohol.    Do not take oral medications within 1 hour of starting each dose of prep.    No tobacco products, gum chewing, or candy morning of procedure     PLEASE DISREGARD THE INSERT INSTRUCTIONS FROM THE PHARMACY.  How to take prep: Mix bowel prep by adding water to fill line and put in refrigerator. Drink at least 12 glasses (8oz glass) of clear liquids during the day. Mix the prep with Crystal Light (no red or purple flavoring), apple juice, or Gatorade (no red or purple) to improve the flavor.    DOSE 1-Day Before Colonoscopy  6:00 pm Take four  (4) Dulcolax (Bisacodyl) Laxative tablets and 1 simethicone (GAS-X) 125 mg capsule with at least 8 ounces or more of clear liquids.    7:00 pm Drink half the liquid in the container (1/2 gallon) within (1.5) hours. Drink an 8 oz glass every 15 minutes.      After midnight, nothing to drink or eat except for the bowel prep.     DOSE 2-Day of the Colonoscopy     [place] the time you were instructed: 2:00 AM       or     5:00AM    For this dose, Drink the remaining half of the liquid prep (1/2 gallon). Please have this consumed within 1 hour and 30 minutes.     After finishing prep, do not drink or eat anything until after the colonoscopy.   You may have a small sip of water with your morning medications. If your stool is brown, orange, or cloudy, your colon is not clean, please call endoscopy staff at [phone].    Endoscopy Scheduling Department at [phone].  Endoscopy Department at [phone].   On-Call Nurse line at [phone].  Financial Services at [phone].    Frequently Asked Questions- Colonoscopy  What are some tips for preparing for a colonoscopy?  Stay near a toilet after taking the prep, you will have diarrhea and may have cramping with your bowel movements.  For skin irritation or flaring of hemorrhoids from frequent bowel movements and wiping, ease with over-the-counter products like baby wipes, Vaseline, or Tucks pads.  If experiencing nausea from the prep, take a 30-minute break, rinse your mouth, and continue drinking the prep. Dramamine (Meclizine) is available over the counter, take ½ of a tablet (12.5 mg) every 6 hours as needed for nausea. Do not take more than 50 mg in 24 hours.   If a long drive or need a change to the prep direction times, please call the endoscopy department to talk with our staff at [phone].  Females between the ages of 10-55 may be asked for a urine sample (pregnancy test) after you check in for your procedure. Please let staff know before  going to the restroom.  Coumadin (WARFARIN), Plavix (CLOPIDOGREL), and Effient (PRASUGREL) MUST be stopped 5 days prior to exam unless discussed with the doctor performing the test. Eliquis (APIXABAN), Savaysa (EDOXABAN), Arixtra (FONDAPARINUX), and Xarelto (RIVAROXABAN) MUST be stopped 2 days prior to exam unless discussed with the doctor performing the test.  Glucagon-like peptide-1 receptor agonists (GLP-1 Elidia) medications (semaglutide -OZEMPIC, RYBELSUS, WEGOVY; Dulaglutide- TRULICITY; Liraglutide- SAXENDA; tirzepatide- MOUNJARO) for weight loss or diabetes, MUST be stopped 7 days prior to exam unless discussed with the doctor performing the test.  Weight loss medications such as Phentermine (Adipex/Lomaira) and Diethylpropion (Amfepraone/Tepanil/Tenuate) should be stopped 7 days prior to exam.  You must have a licensed  to bring you home. If using public transportation, you must have an adult come with you.  Do not take any diabetic medications (including insulin) the morning of the exam. If your blood sugar goes below 70, you may drink 2 ounces of clear juice. Wait 15 minutes, then recheck your blood sugar. If it isn't going up, you may drink another 2 ounces of clear juice and contact the On-Call Nurse line at [phone].   Continue to take blood pressure, heart, thyroid, lung, seizure, and psychological medications the morning of procedure.    Do I have to drink all the bowel prep and water?         Yes, in addition to drinking plenty of clear liquids. Drinking plenty of clear liquids helps the prep to work and keep you hydrated. Any clear liquid that isn't red or purple. Drink at least 12 (8 oz) glasses of clear liquids or three 32 oz Gatorades by 5PM the day before your procedure. Drink   at least 1 (8 oz) glass of liquid every hour while you're awake. After the first dose of prep, drink an additional four (8 oz) glasses of clear liquids before midnight.  Clear liquids include: Coffee (no  "cream/milk)  Water  Tea  Clear carbonated drinks (ginger ale, Sprite, or sparkling water)  Gelatin/Jello  Apple, White grape, White Cranberry Juice  Beef/chicken broth/bouillon  Gatorade/Powerade  Lemonade/limeade  Snowballs/slushes  Popsicles  No "Energy" beverages or alcohol. No juices with pulp.  Do not drink red or purple liquids because the dye will stain the walls of your colon and may appear to be a bleed/abnormality.        The first dose of the prep starts to clean out the stool from your colon. The second dose of the prep helps to fully clean out the colon before the procedure.    What are some ways to improve the taste of the prep?  Put the prep solution in the refrigerator to chill before beginning the prep, tastes best cold.  Drinking the prep with a straw.    How will I know that the bowel prep is working? Why is it important to have a good prep or a clean colon before the procedure?  bowel movements are clear or clear yellow.   Colon should be clean as possible so the doctor can see the walls of the colon and find tiny polyps or colon cancer.  If there is stool in the colon, the doctor cannot move the endoscopy scope through the entire colon and the procedure will be canceled.  If a history of poor bowel prep, constipation, opiate medication use, diabetes, or kidney disease, please let us know; you may require an extended bowel prep to clean your colon.  If brown (including dark orange and cloudy) bowel movements on the morning of your procedure, please call the endoscopy department at [phone] (M-F from 6AM-3PM).      What should I bring to my appointment?  -List of your current medications                                              -Clothing that is easy to put back on after the procedure        -Method of payment        -Your ID         - Insurance card     Leave jewelry and other valuables at home.   Please plan to be at the hospital for 3 - 4 hours.    Why doesn't my appointment time show " up in [facility] or [facility]?  [facility]olog[facility] and [facility] are not set up to show surgical times. You will be called the day before the procedure and told your arrival time.    Where is the Endoscopy department located?  [facility] ([facility]) hospital is located at [address], off [address] ([place]). Once on [address], [facility] is the second building (Entrance #2) on the left. Check in for your procedure on the 1st floor at Registration.

## 2025-04-07 VITALS
WEIGHT: 225 LBS | OXYGEN SATURATION: 95 % | SYSTOLIC BLOOD PRESSURE: 129 MMHG | TEMPERATURE: 98 F | HEIGHT: 74 IN | RESPIRATION RATE: 18 BRPM | BODY MASS INDEX: 28.88 KG/M2 | HEART RATE: 80 BPM | DIASTOLIC BLOOD PRESSURE: 80 MMHG

## 2025-04-09 LAB
ESTROGEN SERPL-MCNC: NORMAL PG/ML
INSULIN SERPL-ACNC: NORMAL U[IU]/ML
LAB AP CLINICAL INFORMATION: NORMAL
LAB AP GROSS DESCRIPTION: NORMAL
LAB AP PERFORMING LOCATION(S): NORMAL
LAB AP REPORT FOOTNOTES: NORMAL

## 2025-04-11 ENCOUNTER — OFFICE VISIT (OUTPATIENT)
Dept: INTERNAL MEDICINE | Facility: CLINIC | Age: 55
End: 2025-04-11
Payer: COMMERCIAL

## 2025-04-11 VITALS
HEART RATE: 87 BPM | RESPIRATION RATE: 14 BRPM | DIASTOLIC BLOOD PRESSURE: 82 MMHG | TEMPERATURE: 98 F | OXYGEN SATURATION: 97 % | BODY MASS INDEX: 29.42 KG/M2 | WEIGHT: 229.25 LBS | SYSTOLIC BLOOD PRESSURE: 140 MMHG | HEIGHT: 74 IN

## 2025-04-11 DIAGNOSIS — Z79.4 DIABETES MELLITUS TYPE 2, INSULIN DEPENDENT: Chronic | ICD-10-CM

## 2025-04-11 DIAGNOSIS — E78.00 PURE HYPERCHOLESTEROLEMIA: Chronic | ICD-10-CM

## 2025-04-11 DIAGNOSIS — N18.31 STAGE 3A CHRONIC KIDNEY DISEASE: Chronic | ICD-10-CM

## 2025-04-11 DIAGNOSIS — I10 ESSENTIAL HYPERTENSION: Primary | Chronic | ICD-10-CM

## 2025-04-11 DIAGNOSIS — E11.9 DIABETES MELLITUS TYPE 2, INSULIN DEPENDENT: Chronic | ICD-10-CM

## 2025-04-11 PROCEDURE — 99999 PR PBB SHADOW E&M-EST. PATIENT-LVL IV: CPT | Mod: PBBFAC,,, | Performed by: NURSE PRACTITIONER

## 2025-04-11 RX ORDER — LISINOPRIL 30 MG/1
30 TABLET ORAL DAILY
Qty: 30 TABLET | Refills: 5 | Status: SHIPPED | OUTPATIENT
Start: 2025-04-11 | End: 2025-10-08

## 2025-04-11 RX ORDER — AMLODIPINE BESYLATE 10 MG/1
10 TABLET ORAL DAILY
Qty: 90 TABLET | Refills: 1 | Status: SHIPPED | OUTPATIENT
Start: 2025-04-11 | End: 2025-10-08

## 2025-04-11 RX ORDER — PRAVASTATIN SODIUM 20 MG/1
20 TABLET ORAL DAILY
Qty: 90 TABLET | Refills: 3 | Status: SHIPPED | OUTPATIENT
Start: 2025-04-11 | End: 2026-04-11

## 2025-04-11 NOTE — ASSESSMENT & PLAN NOTE
Lab Results   Component Value Date    HGBA1C 11.8 (H) 03/07/2025     Improved, remains uncontrolled. Keep scheduled appointment with DM management for further adjustments.

## 2025-04-11 NOTE — ASSESSMENT & PLAN NOTE
"Lab Results   Component Value Date    CHOL 142 03/07/2025    CHOL 172 07/25/2024    CHOL 145 04/04/2024      Lab Results   Component Value Date    HDL 23 (L) 03/07/2025    HDL 26 (L) 07/25/2024    HDL 23 (L) 04/04/2024      No results found for: "LDL"   Lab Results   Component Value Date    TRIG 203 (H) 03/07/2025    TRIG 324 (H) 07/25/2024    TRIG 446 (H) 04/04/2024      No results found for: "DLDL"       Lab Results   Component Value Date    CHOLHDL 16.2 (L) 03/07/2025        "

## 2025-04-11 NOTE — PROGRESS NOTES
Subjective:       Patient ID: Iftikhar Dickson Burns is a 54 y.o. male.    Chief Complaint: Hypertension    History of Present Illness    HPI:  Mr. Burns is following up regarding blood pressure management. He was evaluated by PCP a few weeks ago, at which time his blood pressure was elevated. Since then, his home blood pressure readings have been mostly in the 130s-140s/70s-80s, and one reading as high as 150/97. He reports taking his blood pressure medication every morning, usually around 7:30 AM.    His A1C level were also discussed. While his A1C has decreased from 2 months ago, it remains elevated at 11. He is scheduled to a diabetes , on May 2nd at the [facility] for further management of his diabetes.    He recently underwent a colonoscopy, which revealed 8 polyps. Due to the number of polyps discovered, he has been advised to repeat the colonoscopy every 3 years.    Regarding his other medications, he reports taking Pravastatin at night for cholesterol management. He is not taking Fenofibrate (Tricor) as previously listed in his medications. He also mentions that he cannot take Metformin due to GI issues.     Declines vaccines today.           HPI    Problem List[1]    Family History   Problem Relation Name Age of Onset    Diabetes Mother      Cancer Father       Past Surgical History:   Procedure Laterality Date    NO PAST SURGERIES         Current Medications[2]    Review of Systems   Constitutional:  Negative for activity change, appetite change, chills, fatigue and fever.   Respiratory:  Negative for shortness of breath.    Cardiovascular:  Negative for chest pain, palpitations and leg swelling.   Musculoskeletal:  Negative for myalgias.   Skin:  Negative for rash.   Neurological:  Negative for dizziness, weakness, light-headedness and headaches.       Objective:   BP (!) 140/82 (BP Location: Left arm, Patient Position: Sitting)   Pulse 87   Temp 98.1 °F (36.7 °C) (Oral)   Resp  "14   Ht 6' 2" (1.88 m)   Wt 104 kg (229 lb 4.5 oz)   SpO2 97%   BMI 29.44 kg/m²      Physical Exam  Vitals reviewed.   Constitutional:       General: He is not in acute distress.     Appearance: Normal appearance. He is not ill-appearing, toxic-appearing or diaphoretic.   Eyes:      Conjunctiva/sclera: Conjunctivae normal.   Cardiovascular:      Rate and Rhythm: Normal rate.      Heart sounds: No murmur heard.  Pulmonary:      Effort: Pulmonary effort is normal. No respiratory distress.      Breath sounds: Normal breath sounds.   Neurological:      Mental Status: He is alert and oriented to person, place, and time.      Coordination: Coordination normal.      Gait: Gait normal.   Psychiatric:         Mood and Affect: Mood normal.         Behavior: Behavior normal.         Assessment & Plan     Assessment & Plan    TYPE 2 DIABETES MELLITUS:  - Evaluated A1c, which decreased but remains elevated at 11, indicating uncontrolled diabetes.  - Maintained current insulin regimen pending diabetes  review.  - Scheduled the patient for an appointment with diabetes management on May 2nd.  - Continued the patient on insulin therapy, including Lantus at night and Novolog.  - Referred the patient to diabetes management for further treatment and potential medication adjustments.      ESSENTIAL HYPERTENSION:  - Assessed blood pressure control, noted home readings in 130s-140s  - Rechecked blood pressure during the visit, which was 140/82.  - Increased lisinopril from 20 mg to 30 mg daily   - Continued amlodipine 10 mg daily.  - Instructed the patient to continue monitoring and logging blood pressure readings at home.  - Advised the patient to bring blood pressure log to nurse visit.  - Scheduled follow up in 2 weeks for blood pressure check with nurse.  - Noted that if blood pressure remains elevated at follow-up, lisinopril may be increased to 40 mg.  - Recommend maintaining low-salt diet.    HYPERLIPIDEMIA " "AND HYPERTRIGLYCERIDEMIA:  - Evaluated recent cholesterol levels, particularly LDL.  - Continued Pravastatin at night for cholesterol management.  - Will hold off on Fenofibrate for now.    HISTORY OF COLON POLYPS:  - Reviewed recent colonoscopy results; noted multiple benign polyps found.  - Recommend repeat colonoscopy every 3 years due to the number of polyps found.          1. Essential hypertension  -     amLODIPine (NORVASC) 10 MG tablet; Take 1 tablet (10 mg total) by mouth once daily.  Dispense: 90 tablet; Refill: 1  -     lisinopriL (PRINIVIL,ZESTRIL) 30 MG tablet; Take 1 tablet (30 mg total) by mouth once daily.  Dispense: 30 tablet; Refill: 5    2. Pure hypercholesterolemia  Assessment & Plan:  Lab Results   Component Value Date    CHOL 142 03/07/2025    CHOL 172 07/25/2024    CHOL 145 04/04/2024      Lab Results   Component Value Date    HDL 23 (L) 03/07/2025    HDL 26 (L) 07/25/2024    HDL 23 (L) 04/04/2024      No results found for: "LDL"   Lab Results   Component Value Date    TRIG 203 (H) 03/07/2025    TRIG 324 (H) 07/25/2024    TRIG 446 (H) 04/04/2024      No results found for: "DLDL"       Lab Results   Component Value Date    CHOLHDL 16.2 (L) 03/07/2025          Orders:  -     pravastatin (PRAVACHOL) 20 MG tablet; Take 1 tablet (20 mg total) by mouth once daily.  Dispense: 90 tablet; Refill: 3    3. Stage 3a chronic kidney disease  Assessment & Plan:  Last GFR 47, creatinine 1.7. Continue to focus on HTN and DM control      4. Diabetes mellitus type 2, insulin dependent  Assessment & Plan:  Lab Results   Component Value Date    HGBA1C 11.8 (H) 03/07/2025     Improved, remains uncontrolled. Keep scheduled appointment with DM management for further adjustments.          Visit today included increased complexity associated with the care of the episodic problem addressed and managing the longitudinal care of the patient due to the serious and/or complex managed problem(s).      Blanca Flores, " "FNP-C    This note was generated with the assistance of ambient listening technology. Verbal consent was obtained by the patient and accompanying visitor(s) for the recording of patient appointment to facilitate this note. I attest to having reviewed and edited the generated note for accuracy, though some syntax or spelling errors may persist. Please contact the author of this note for any clarification.       Portions of this note may have been created with voice recognition software. Occasional "wrong-word" or "sound-a-like" substitutions may have occurred due to the inherent limitations of voice recognition software. Please, read the note carefully and recognize, using context, where substitutions have occurred.             [1]   Patient Active Problem List  Diagnosis    Diabetes mellitus    Right epididymitis    Renal dysfunction    Hyperglycemia    Diabetes mellitus type 2, insulin dependent    ED (erectile dysfunction)    Screening for colon cancer    Pure hypercholesterolemia    Stage 3a chronic kidney disease    Essential hypertension   [2]   Current Outpatient Medications:     aspirin 81 MG Chew, Take 81 mg by mouth once daily., Disp: , Rfl:     BD ULTRA-FINE SHORT PEN NEEDLE 31 gauge x 5/16" Ndle, Inject into the skin 4 (four) times daily., Disp: , Rfl:     blood sugar diagnostic (TRUE METRIX GLUCOSE TEST STRIP) Strp, Check b/s TID, Disp: , Rfl:     blood-glucose meter Misc, Check b/s daily, Disp: , Rfl:     insulin (LANTUS SOLOSTAR U-100 INSULIN) glargine 100 units/mL (3mL) SubQ pen, Inject 50 Units into the skin every evening., Disp: , Rfl:     insulin aspart U-100 (NOVOLOG) 100 unit/mL (3 mL) InPn pen, Inject 23 Units into the skin 3 (three) times daily with meals. , Disp: , Rfl:     lancets Misc, Check b/s TID, Disp: , Rfl:     pen needle, diabetic 31 gauge x 1/4" Ndle, Use QID with Lantus and Novolog, Disp: , Rfl:     amLODIPine (NORVASC) 10 MG tablet, Take 1 tablet (10 mg total) by mouth once daily., " Disp: 90 tablet, Rfl: 1    lisinopriL (PRINIVIL,ZESTRIL) 30 MG tablet, Take 1 tablet (30 mg total) by mouth once daily., Disp: 30 tablet, Rfl: 5    pravastatin (PRAVACHOL) 20 MG tablet, Take 1 tablet (20 mg total) by mouth once daily., Disp: 90 tablet, Rfl: 3

## 2025-04-14 ENCOUNTER — RESULTS FOLLOW-UP (OUTPATIENT)
Dept: SURGERY | Facility: HOSPITAL | Age: 55
End: 2025-04-14

## 2025-04-25 ENCOUNTER — CLINICAL SUPPORT (OUTPATIENT)
Dept: INTERNAL MEDICINE | Facility: CLINIC | Age: 55
End: 2025-04-25
Payer: COMMERCIAL

## 2025-04-25 VITALS — DIASTOLIC BLOOD PRESSURE: 92 MMHG | SYSTOLIC BLOOD PRESSURE: 146 MMHG

## 2025-04-25 DIAGNOSIS — I10 ESSENTIAL HYPERTENSION: Primary | ICD-10-CM

## 2025-04-25 PROCEDURE — 99999 PR PBB SHADOW E&M-EST. PATIENT-LVL III: CPT | Mod: PBBFAC,,,

## 2025-04-25 RX ORDER — LISINOPRIL 40 MG/1
40 TABLET ORAL DAILY
Qty: 30 TABLET | Refills: 2 | Status: SHIPPED | OUTPATIENT
Start: 2025-04-25 | End: 2025-07-24

## 2025-04-25 NOTE — PROGRESS NOTES
Patient came in for blood pressure check 1st reading 144/86 after sitting 20 minutes  2nd reading 146/92. Patient states he has taken medications

## 2025-05-02 ENCOUNTER — TELEPHONE (OUTPATIENT)
Dept: DIABETES | Facility: CLINIC | Age: 55
End: 2025-05-02
Payer: COMMERCIAL

## 2025-05-02 NOTE — TELEPHONE ENCOUNTER
----- Message from Cinthia sent at 5/2/2025  8:44 AM CDT -----  Type:  Patient Returning CallWho Called:IsrealEstiven Left Message for Patient:Does the patient know what this is regarding?:apptWould the patient rather a call back or a response via [facility]? CallbackBe Call Back Number: [phone] Information: patient is calling to reschedule appt. Please callback to assist

## 2025-05-19 DIAGNOSIS — I10 ESSENTIAL HYPERTENSION: Chronic | ICD-10-CM

## 2025-05-19 RX ORDER — AMLODIPINE BESYLATE 10 MG/1
10 TABLET ORAL DAILY
Qty: 90 TABLET | Refills: 1 | Status: SHIPPED | OUTPATIENT
Start: 2025-05-19 | End: 2025-11-15

## 2025-05-19 NOTE — TELEPHONE ENCOUNTER
No care due was identified.  [facility] Embedded Care Due Messages. Reference number: [id number].   5/19/2025 11:59:47 AM CDT

## 2025-05-24 ENCOUNTER — HOSPITAL ENCOUNTER (EMERGENCY)
Facility: HOSPITAL | Age: 55
Discharge: HOME | End: 2025-05-25
Attending: EMERGENCY MEDICINE
Payer: COMMERCIAL

## 2025-05-24 DIAGNOSIS — E11.65 HYPERGLYCEMIA DUE TO DIABETES MELLITUS: ICD-10-CM

## 2025-05-24 DIAGNOSIS — N20.1 URETEROLITHIASIS: Primary | ICD-10-CM

## 2025-05-24 DIAGNOSIS — I10 HYPERTENSION, UNSPECIFIED TYPE: ICD-10-CM

## 2025-05-24 LAB
ABSOLUTE EOSINOPHIL (OHS): 0.06 K/UL
ABSOLUTE MONOCYTE (OHS): 0.54 K/UL (ref 0.3–1)
ABSOLUTE NEUTROPHIL COUNT (OHS): 4.41 K/UL (ref 1.8–7.7)
ALBUMIN SERPL BCP-MCNC: 4.2 G/DL (ref 3.5–5.2)
ALP SERPL-CCNC: 149 UNIT/L (ref 40–150)
ALT SERPL W/O P-5'-P-CCNC: 30 UNIT/L (ref 10–44)
ANION GAP (OHS): 14 MMOL/L (ref 8–16)
AST SERPL-CCNC: 19 UNIT/L (ref 11–45)
BACTERIA #/AREA URNS HPF: NORMAL /HPF
BASOPHILS # BLD AUTO: 0.02 K/UL
BASOPHILS NFR BLD AUTO: 0.3 %
BILIRUB SERPL-MCNC: 0.3 MG/DL (ref 0.1–1)
BILIRUB UR QL STRIP.AUTO: NEGATIVE
BUN SERPL-MCNC: 27 MG/DL (ref 6–20)
CALCIUM SERPL-MCNC: 10.1 MG/DL (ref 8.7–10.5)
CHLORIDE SERPL-SCNC: 95 MMOL/L (ref 95–110)
CLARITY UR: CLEAR
CO2 SERPL-SCNC: 21 MMOL/L (ref 23–29)
COLOR UR AUTO: YELLOW
CREAT SERPL-MCNC: 2.6 MG/DL (ref 0.5–1.4)
ERYTHROCYTE [DISTWIDTH] IN BLOOD BY AUTOMATED COUNT: 14.6 % (ref 11.5–14.5)
GFR SERPLBLD CREATININE-BSD FMLA CKD-EPI: 28 ML/MIN/1.73/M2
GLUCOSE SERPL-MCNC: 712 MG/DL (ref 70–110)
GLUCOSE UR QL STRIP: ABNORMAL
HCT VFR BLD AUTO: 40.3 % (ref 40–54)
HGB BLD-MCNC: 13.2 GM/DL (ref 14–18)
HGB UR QL STRIP: ABNORMAL
HOLD SPECIMEN: NORMAL
IMM GRANULOCYTES # BLD AUTO: 0.01 K/UL (ref 0–0.04)
IMM GRANULOCYTES NFR BLD AUTO: 0.1 % (ref 0–0.5)
KETONES UR QL STRIP: NEGATIVE
LEUKOCYTE ESTERASE UR QL STRIP: NEGATIVE
LIPASE SERPL-CCNC: 73 U/L (ref 4–60)
LYMPHOCYTES # BLD AUTO: 2.3 K/UL (ref 1–4.8)
MCH RBC QN AUTO: 25.7 PG (ref 27–31)
MCHC RBC AUTO-ENTMCNC: 32.8 G/DL (ref 32–36)
MCV RBC AUTO: 78 FL (ref 82–98)
MICROSCOPIC COMMENT: NORMAL
NITRITE UR QL STRIP: NEGATIVE
NUCLEATED RBC (/100WBC) (OHS): 0 /100 WBC
PH UR STRIP: 6 [PH]
PLATELET # BLD AUTO: 270 K/UL (ref 150–450)
PMV BLD AUTO: 10.5 FL (ref 9.2–12.9)
POCT GLUCOSE: 448 MG/DL (ref 70–110)
POCT GLUCOSE: >500 MG/DL (ref 70–110)
POCT GLUCOSE: >500 MG/DL (ref 70–110)
POTASSIUM SERPL-SCNC: 4.3 MMOL/L (ref 3.5–5.1)
PROT SERPL-MCNC: 9.4 GM/DL (ref 6–8.4)
PROT UR QL STRIP: NEGATIVE
RBC # BLD AUTO: 5.14 M/UL (ref 4.6–6.2)
RBC #/AREA URNS HPF: 3 /HPF (ref 0–4)
RELATIVE EOSINOPHIL (OHS): 0.8 %
RELATIVE LYMPHOCYTE (OHS): 31.3 % (ref 18–48)
RELATIVE MONOCYTE (OHS): 7.4 % (ref 4–15)
RELATIVE NEUTROPHIL (OHS): 60.1 % (ref 38–73)
SODIUM SERPL-SCNC: 130 MMOL/L (ref 136–145)
SP GR UR STRIP: 1.01
UROBILINOGEN UR STRIP-ACNC: NEGATIVE EU/DL
WBC # BLD AUTO: 7.34 K/UL (ref 3.9–12.7)
WBC #/AREA URNS HPF: 2 /HPF (ref 0–5)
YEAST URNS QL MICRO: NORMAL /HPF

## 2025-05-24 PROCEDURE — 25000003 PHARM REV CODE 250: Mod: ER | Performed by: EMERGENCY MEDICINE

## 2025-05-24 PROCEDURE — 83690 ASSAY OF LIPASE: CPT | Mod: ER | Performed by: EMERGENCY MEDICINE

## 2025-05-24 PROCEDURE — 96376 TX/PRO/DX INJ SAME DRUG ADON: CPT | Mod: ER

## 2025-05-24 PROCEDURE — 96375 TX/PRO/DX INJ NEW DRUG ADDON: CPT | Mod: ER

## 2025-05-24 PROCEDURE — 80053 COMPREHEN METABOLIC PANEL: CPT | Mod: ER | Performed by: EMERGENCY MEDICINE

## 2025-05-24 PROCEDURE — 81003 URINALYSIS AUTO W/O SCOPE: CPT | Mod: ER | Performed by: EMERGENCY MEDICINE

## 2025-05-24 PROCEDURE — 99285 EMERGENCY DEPT VISIT HI MDM: CPT | Mod: 25,ER

## 2025-05-24 PROCEDURE — 96361 HYDRATE IV INFUSION ADD-ON: CPT | Mod: ER

## 2025-05-24 PROCEDURE — 63600175 PHARM REV CODE 636 W HCPCS: Mod: ER | Performed by: EMERGENCY MEDICINE

## 2025-05-24 PROCEDURE — 85025 COMPLETE CBC W/AUTO DIFF WBC: CPT | Mod: ER | Performed by: EMERGENCY MEDICINE

## 2025-05-24 PROCEDURE — 96365 THER/PROPH/DIAG IV INF INIT: CPT | Mod: ER

## 2025-05-24 PROCEDURE — 82962 GLUCOSE BLOOD TEST: CPT | Mod: ER

## 2025-05-24 RX ORDER — ONDANSETRON HYDROCHLORIDE 2 MG/ML
4 INJECTION, SOLUTION INTRAVENOUS
Status: COMPLETED | OUTPATIENT
Start: 2025-05-24 | End: 2025-05-24

## 2025-05-24 RX ORDER — KETOROLAC TROMETHAMINE 30 MG/ML
10 INJECTION, SOLUTION INTRAMUSCULAR; INTRAVENOUS
Status: DISCONTINUED | OUTPATIENT
Start: 2025-05-24 | End: 2025-05-24

## 2025-05-24 RX ORDER — HYDROMORPHONE HYDROCHLORIDE 1 MG/ML
1 INJECTION, SOLUTION INTRAMUSCULAR; INTRAVENOUS; SUBCUTANEOUS
Refills: 0 | Status: COMPLETED | OUTPATIENT
Start: 2025-05-24 | End: 2025-05-24

## 2025-05-24 RX ADMIN — ONDANSETRON 4 MG: 2 INJECTION INTRAMUSCULAR; INTRAVENOUS at 08:05

## 2025-05-24 RX ADMIN — HUMAN INSULIN 5 UNITS: 100 INJECTION, SOLUTION SUBCUTANEOUS at 11:05

## 2025-05-24 RX ADMIN — HUMAN INSULIN 5 UNITS: 100 INJECTION, SOLUTION SUBCUTANEOUS at 09:05

## 2025-05-24 RX ADMIN — HUMAN INSULIN 10 UNITS: 100 INJECTION, SOLUTION SUBCUTANEOUS at 08:05

## 2025-05-24 RX ADMIN — SODIUM CHLORIDE 1000 ML: 0.9 INJECTION, SOLUTION INTRAVENOUS at 07:05

## 2025-05-24 RX ADMIN — PROMETHAZINE HYDROCHLORIDE 12.5 MG: 25 INJECTION INTRAMUSCULAR; INTRAVENOUS at 07:05

## 2025-05-24 RX ADMIN — HYDROMORPHONE HYDROCHLORIDE 1 MG: 1 INJECTION, SOLUTION INTRAMUSCULAR; INTRAVENOUS; SUBCUTANEOUS at 07:05

## 2025-05-24 RX ADMIN — SODIUM CHLORIDE 1000 ML: 9 INJECTION, SOLUTION INTRAVENOUS at 08:05

## 2025-05-24 RX ADMIN — SODIUM CHLORIDE 1000 ML: 9 INJECTION, SOLUTION INTRAVENOUS at 09:05

## 2025-05-25 VITALS
HEIGHT: 74 IN | OXYGEN SATURATION: 96 % | DIASTOLIC BLOOD PRESSURE: 64 MMHG | BODY MASS INDEX: 27.63 KG/M2 | WEIGHT: 215.25 LBS | RESPIRATION RATE: 16 BRPM | SYSTOLIC BLOOD PRESSURE: 164 MMHG | HEART RATE: 74 BPM | TEMPERATURE: 97 F

## 2025-05-25 LAB
ANION GAP (OHS): 13 MMOL/L (ref 8–16)
BUN SERPL-MCNC: 24 MG/DL (ref 6–20)
CALCIUM SERPL-MCNC: 8.9 MG/DL (ref 8.7–10.5)
CHLORIDE SERPL-SCNC: 106 MMOL/L (ref 95–110)
CO2 SERPL-SCNC: 18 MMOL/L (ref 23–29)
CREAT SERPL-MCNC: 2 MG/DL (ref 0.5–1.4)
GFR SERPLBLD CREATININE-BSD FMLA CKD-EPI: 38 ML/MIN/1.73/M2
GLUCOSE SERPL-MCNC: 445 MG/DL (ref 70–110)
POCT GLUCOSE: 361 MG/DL (ref 70–110)
POCT GLUCOSE: 448 MG/DL (ref 70–110)
POTASSIUM SERPL-SCNC: 4.1 MMOL/L (ref 3.5–5.1)
SODIUM SERPL-SCNC: 137 MMOL/L (ref 136–145)

## 2025-05-25 PROCEDURE — 63600175 PHARM REV CODE 636 W HCPCS: Mod: ER | Performed by: EMERGENCY MEDICINE

## 2025-05-25 PROCEDURE — 82962 GLUCOSE BLOOD TEST: CPT | Mod: ER

## 2025-05-25 PROCEDURE — 96361 HYDRATE IV INFUSION ADD-ON: CPT | Mod: ER

## 2025-05-25 PROCEDURE — 80048 BASIC METABOLIC PNL TOTAL CA: CPT | Mod: ER | Performed by: EMERGENCY MEDICINE

## 2025-05-25 PROCEDURE — 25000003 PHARM REV CODE 250: Mod: ER | Performed by: EMERGENCY MEDICINE

## 2025-05-25 RX ORDER — HYDROCODONE BITARTRATE AND ACETAMINOPHEN 5; 325 MG/1; MG/1
1 TABLET ORAL EVERY 6 HOURS PRN
Qty: 12 TABLET | Refills: 0 | Status: SHIPPED | OUTPATIENT
Start: 2025-05-25

## 2025-05-25 RX ORDER — TAMSULOSIN HYDROCHLORIDE 0.4 MG/1
0.4 CAPSULE ORAL DAILY
Qty: 30 CAPSULE | Refills: 0 | Status: SHIPPED | OUTPATIENT
Start: 2025-05-25 | End: 2026-05-25

## 2025-05-25 RX ADMIN — HUMAN INSULIN 7 UNITS: 100 INJECTION, SOLUTION SUBCUTANEOUS at 12:05

## 2025-05-25 RX ADMIN — SODIUM CHLORIDE 1000 ML: 9 INJECTION, SOLUTION INTRAVENOUS at 12:05

## 2025-06-18 DIAGNOSIS — E11.9 TYPE 2 DIABETES MELLITUS WITHOUT COMPLICATION: ICD-10-CM

## 2025-07-17 ENCOUNTER — TELEPHONE (OUTPATIENT)
Dept: DIABETES | Facility: CLINIC | Age: 55
End: 2025-07-17
Payer: COMMERCIAL

## 2025-07-17 ENCOUNTER — OFFICE VISIT (OUTPATIENT)
Dept: DIABETES | Facility: CLINIC | Age: 55
End: 2025-07-17
Payer: COMMERCIAL

## 2025-07-17 ENCOUNTER — LAB VISIT (OUTPATIENT)
Dept: LAB | Facility: HOSPITAL | Age: 55
End: 2025-07-17
Attending: PHYSICIAN ASSISTANT
Payer: COMMERCIAL

## 2025-07-17 ENCOUNTER — TELEPHONE (OUTPATIENT)
Dept: INTERNAL MEDICINE | Facility: CLINIC | Age: 55
End: 2025-07-17
Payer: COMMERCIAL

## 2025-07-17 VITALS
BODY MASS INDEX: 27.95 KG/M2 | DIASTOLIC BLOOD PRESSURE: 98 MMHG | WEIGHT: 217.81 LBS | SYSTOLIC BLOOD PRESSURE: 146 MMHG | OXYGEN SATURATION: 97 % | HEART RATE: 84 BPM | HEIGHT: 74 IN

## 2025-07-17 DIAGNOSIS — Z79.4 DIABETES MELLITUS TYPE 2, INSULIN DEPENDENT: Primary | Chronic | ICD-10-CM

## 2025-07-17 DIAGNOSIS — E11.9 DIABETES MELLITUS TYPE 2, INSULIN DEPENDENT: Primary | Chronic | ICD-10-CM

## 2025-07-17 DIAGNOSIS — E78.00 PURE HYPERCHOLESTEROLEMIA: Chronic | ICD-10-CM

## 2025-07-17 DIAGNOSIS — Z79.4 DIABETES MELLITUS TYPE 2, INSULIN DEPENDENT: Chronic | ICD-10-CM

## 2025-07-17 DIAGNOSIS — E66.3 OVERWEIGHT (BMI 25.0-29.9): ICD-10-CM

## 2025-07-17 DIAGNOSIS — N18.32 CKD STAGE 3B, GFR 30-44 ML/MIN: ICD-10-CM

## 2025-07-17 DIAGNOSIS — I10 ESSENTIAL HYPERTENSION: Chronic | ICD-10-CM

## 2025-07-17 DIAGNOSIS — E11.9 DIABETES MELLITUS TYPE 2, INSULIN DEPENDENT: Chronic | ICD-10-CM

## 2025-07-17 PROBLEM — N18.31 STAGE 3A CHRONIC KIDNEY DISEASE: Chronic | Status: RESOLVED | Noted: 2017-02-28 | Resolved: 2025-07-17

## 2025-07-17 PROBLEM — R73.9 HYPERGLYCEMIA: Status: RESOLVED | Noted: 2021-10-05 | Resolved: 2025-07-17

## 2025-07-17 LAB
ALBUMIN SERPL BCP-MCNC: 4.5 G/DL (ref 3.5–5.2)
ALP SERPL-CCNC: 109 UNIT/L (ref 40–150)
ALT SERPL W/O P-5'-P-CCNC: 40 UNIT/L (ref 10–44)
ANION GAP (OHS): 12 MMOL/L (ref 8–16)
AST SERPL-CCNC: 27 UNIT/L (ref 11–45)
BILIRUB SERPL-MCNC: 0.5 MG/DL (ref 0.1–1)
BUN SERPL-MCNC: 16 MG/DL (ref 6–20)
C PEPTIDE SERPL-MCNC: 0.71 NG/ML (ref 0.78–5.19)
CALCIUM SERPL-MCNC: 10 MG/DL (ref 8.7–10.5)
CHLORIDE SERPL-SCNC: 104 MMOL/L (ref 95–110)
CO2 SERPL-SCNC: 26 MMOL/L (ref 23–29)
CREAT SERPL-MCNC: 1.6 MG/DL (ref 0.5–1.4)
EAG (OHS): 306 MG/DL (ref 68–131)
GFR SERPLBLD CREATININE-BSD FMLA CKD-EPI: 50 ML/MIN/1.73/M2
GLUCOSE SERPL-MCNC: 150 MG/DL (ref 70–110)
GLUCOSE SERPL-MCNC: 209 MG/DL (ref 70–110)
HBA1C MFR BLD: 12.3 % (ref 4–5.6)
POTASSIUM SERPL-SCNC: 3.9 MMOL/L (ref 3.5–5.1)
PROT SERPL-MCNC: 9.1 GM/DL (ref 6–8.4)
SODIUM SERPL-SCNC: 142 MMOL/L (ref 136–145)
TSH SERPL-ACNC: 1.14 UIU/ML (ref 0.4–4)

## 2025-07-17 PROCEDURE — 3008F BODY MASS INDEX DOCD: CPT | Mod: CPTII,S$GLB,, | Performed by: PHYSICIAN ASSISTANT

## 2025-07-17 PROCEDURE — 3066F NEPHROPATHY DOC TX: CPT | Mod: CPTII,S$GLB,, | Performed by: PHYSICIAN ASSISTANT

## 2025-07-17 PROCEDURE — G2211 COMPLEX E/M VISIT ADD ON: HCPCS | Mod: S$GLB,,, | Performed by: PHYSICIAN ASSISTANT

## 2025-07-17 PROCEDURE — 1159F MED LIST DOCD IN RCRD: CPT | Mod: CPTII,S$GLB,, | Performed by: PHYSICIAN ASSISTANT

## 2025-07-17 PROCEDURE — 99999 PR PBB SHADOW E&M-EST. PATIENT-LVL V: CPT | Mod: PBBFAC,,, | Performed by: PHYSICIAN ASSISTANT

## 2025-07-17 PROCEDURE — 36415 COLL VENOUS BLD VENIPUNCTURE: CPT | Mod: PO

## 2025-07-17 PROCEDURE — 84681 ASSAY OF C-PEPTIDE: CPT

## 2025-07-17 PROCEDURE — 3060F POS MICROALBUMINURIA REV: CPT | Mod: CPTII,S$GLB,, | Performed by: PHYSICIAN ASSISTANT

## 2025-07-17 PROCEDURE — 3080F DIAST BP >= 90 MM HG: CPT | Mod: CPTII,S$GLB,, | Performed by: PHYSICIAN ASSISTANT

## 2025-07-17 PROCEDURE — 99205 OFFICE O/P NEW HI 60 MIN: CPT | Mod: S$GLB,,, | Performed by: PHYSICIAN ASSISTANT

## 2025-07-17 PROCEDURE — 4010F ACE/ARB THERAPY RXD/TAKEN: CPT | Mod: CPTII,S$GLB,, | Performed by: PHYSICIAN ASSISTANT

## 2025-07-17 PROCEDURE — 3077F SYST BP >= 140 MM HG: CPT | Mod: CPTII,S$GLB,, | Performed by: PHYSICIAN ASSISTANT

## 2025-07-17 PROCEDURE — 3046F HEMOGLOBIN A1C LEVEL >9.0%: CPT | Mod: CPTII,S$GLB,, | Performed by: PHYSICIAN ASSISTANT

## 2025-07-17 PROCEDURE — 86341 ISLET CELL ANTIBODY: CPT

## 2025-07-17 PROCEDURE — 83036 HEMOGLOBIN GLYCOSYLATED A1C: CPT

## 2025-07-17 PROCEDURE — 84075 ASSAY ALKALINE PHOSPHATASE: CPT | Mod: PO

## 2025-07-17 PROCEDURE — 82962 GLUCOSE BLOOD TEST: CPT | Mod: S$GLB,,, | Performed by: PHYSICIAN ASSISTANT

## 2025-07-17 PROCEDURE — 84443 ASSAY THYROID STIM HORMONE: CPT | Mod: PO

## 2025-07-17 RX ORDER — PEN NEEDLE, DIABETIC 30 GX3/16"
1 NEEDLE, DISPOSABLE MISCELLANEOUS
Qty: 100 EACH | Refills: 11 | Status: SHIPPED | OUTPATIENT
Start: 2025-07-17 | End: 2026-07-17

## 2025-07-17 RX ORDER — LISINOPRIL 40 MG/1
40 TABLET ORAL DAILY
Qty: 90 TABLET | Refills: 1 | Status: SHIPPED | OUTPATIENT
Start: 2025-07-17 | End: 2026-01-13

## 2025-07-17 RX ORDER — SEMAGLUTIDE 1.34 MG/ML
INJECTION, SOLUTION SUBCUTANEOUS
Qty: 1 EACH | Refills: 11 | Status: SHIPPED | OUTPATIENT
Start: 2025-07-17 | End: 2026-07-17

## 2025-07-17 RX ORDER — BLOOD-GLUCOSE SENSOR
1 EACH MISCELLANEOUS
Qty: 3 EACH | Refills: 11 | Status: SHIPPED | OUTPATIENT
Start: 2025-07-17 | End: 2026-07-17

## 2025-07-17 RX ORDER — INSULIN PUMP SYRINGE, 3 ML
EACH MISCELLANEOUS
Qty: 1 EACH | Refills: 0 | Status: SHIPPED | OUTPATIENT
Start: 2025-07-17 | End: 2026-07-17

## 2025-07-17 RX ORDER — LANCETS
EACH MISCELLANEOUS
Qty: 100 EACH | Refills: 11 | Status: SHIPPED | OUTPATIENT
Start: 2025-07-17

## 2025-07-17 RX ORDER — AMLODIPINE BESYLATE 10 MG/1
10 TABLET ORAL DAILY
Qty: 90 TABLET | Refills: 1 | Status: SHIPPED | OUTPATIENT
Start: 2025-07-17 | End: 2026-01-13

## 2025-07-17 NOTE — TELEPHONE ENCOUNTER
Received message from DM management. Patient has been off amlodipine and lisinopril x 1 month due to issue with pharmacy.     Refills sent to pharmacy. Please let patient know refills for lisinopril and amlodipine were sent to pharmacy. Advise him to notify clinic if he has any issues getting the prescriptions filled.

## 2025-07-17 NOTE — TELEPHONE ENCOUNTER
----- Message from Jamey Madrigal PA-C sent at 7/17/2025 10:47 AM CDT -----  Move f/u with me to 8/29 at 1p   Book education visit with [place] at [facility] 60 mins education at 11a on 9/5     Call pt and let him know we switched his 9/5 visit with me to 8/29 and added the 9/5 with [place]. Please mail appt letters

## 2025-07-17 NOTE — PATIENT INSTRUCTIONS
CURRENT DM MEDICATIONS:   Lantus 50 units before bed - change to 45 units before bed the day you start Ozempic   Novolog 16 units before light meals OR 26 units before regular meals TID - take 10 mins before eating; if not eating skip  Ozempic 0.25 mg weekly - take on Friday in the evening with light meal; let me know if you have nausea; nausea may go away after 1st 2 doses    OZEMPIC SAVINGS CARD - bring to Pharmacy to help with cost      DEXCOM G7 Continuous glucose monitor sent to pharmacy to help monitor your sugar - once you get patches call me to set up at [place]    VACCINES:   Need 1 more Pneumonia Vaccine (PCV 20)   Need 2 shinges vaccines   Can get another COVID booster if wanted

## 2025-07-17 NOTE — TELEPHONE ENCOUNTER
Called pt to inform pt of appointment adjustments, pt didn't answer. Lvm for pt to return call.     Appt has been mailed

## 2025-07-17 NOTE — PROGRESS NOTES
PCP: Gómez Cao MD    Subjective:     Chief Complaint: Diabetes Consult    HISTORY OF PRESENT ILLNESS: 54 y.o.   male presenting for diabetes consult.   Patient has had Type II diabetes since 2016.  Pertinent to decision making is the following comorbidities: HTN, HLD, CKD III, and Overweight by BMI  Patient has the following Diabetes complications: with diabetic chronic kidney disease  He  has attended diabetes education in the past at [facility].     Patient's most recent A1c of 11.8% was completed 4 months ago.   Patient states since His last A1c His blood glucose levels have been high  throughout the day .   Patient monitors blood glucose 4 times per day with meter : Fasting, Before Lunch, Before Dinner, and Symptomatic.   Patient blood glucose monitoring device will not be uploaded into [employer] [facility] today.  Patient reports fasting labs ranging from 100 - 200s, before lunch 140 - 300s, and before dinner  140 - 300s.   Patient endorses the following diabetes related symptoms: blurry vision. Usually present with hyperglycemia.   Patient is due today for the following diabetes-related health maintenance standards: Foot Exam , Eye Exam, Statin therapy per ADA guidelines, A1c, COVID-19 Vaccine , and vaccines  He denies recent hospital admissions or emergency room visits.   He voices having hypoglycemia - like symptoms after light meals at times and down to 50 at times. Patient endorses hypoglycemia unawareness.   Patient's concerns today include glycemic control.  Of note, patient has an elevated BP of 146/98 today upon presentation to clinic. Patient is resting comfortably in the room. Patient admits missing BP medications Lisinopril and Amlodipine over the last month secondary to pharmacy issues. Patient denies CP, SOB, Palpitations, Abdominal Pain, Nausea/Vomiting, Headache, and other concerning symptoms. Patient states her BP management is primarily managed by PCP team. Discussed with patient we  "would need to repeat BP at end of visit since resting comfortably with formulation of BP management plan.  Reports having seen a dietician at [facility] - drink zero carb liquids and is active at work (works for [employer]).   Patient medication regimen is as below.     CURRENT DM MEDICATIONS:   Lantus 50 units before bed   Novolog 26 units before meals TID - misses lunch dose on weekdays at work; taking right before eating or sometimes way before eating; holding if pre meal is 130 or less     Patient has failed the following Diabetes medications:   Metformin - GI   Jardiance - cost      Labs Reviewed.       No results found for: "CPEPTIDE"  No results found for: "GLUTAMICACID"    Height: 6' 2" (188 cm)  //  Weight: 98.8 kg (217 lb 13 oz), Body mass index is 27.97 kg/m².  His blood sugar in clinic today is:    No components found for: "POCGLUC"      Review of Systems   Constitutional:  Negative for activity change, appetite change, chills and fever.   HENT:  Negative for dental problem, mouth sores, nosebleeds, sore throat and trouble swallowing.    Eyes:  Negative for pain and discharge.   Respiratory:  Negative for shortness of breath, wheezing and stridor.    Cardiovascular:  Negative for chest pain, palpitations and leg swelling.   Gastrointestinal:  Negative for abdominal pain, diarrhea, nausea and vomiting.   Endocrine: Negative for polydipsia, polyphagia and polyuria.   Genitourinary:  Negative for dysuria, frequency and urgency.   Musculoskeletal:  Negative for joint swelling and myalgias.   Skin:  Negative for rash and wound.   Neurological:  Negative for dizziness, syncope, weakness and headaches.   Psychiatric/Behavioral:  Negative for behavioral problems and dysphoric mood.          Diabetes Management Status  Statin: Taking  ACE/ARB: Taking    Screening or Prevention Patient's value Goal Complete/Controlled?   HgA1C Testing and Control   Lab Results   Component Value Date    HGBA1C 11.8 (H) 03/07/2025 "      Annually/Less than 8% No   Lipid profile : 03/07/2025 Annually Yes   LDL control Lab Results   Component Value Date    LDLCALC 78.4 03/07/2025    Annually/Less than 100 mg/dl  Yes   Nephropathy screening Lab Results   Component Value Date    MICALBCREAT 122.9 (H) 03/07/2025     Lab Results   Component Value Date    PROTEINUA Negative 05/24/2025    Annually Yes   Blood pressure BP Readings from Last 1 Encounters:   05/25/25 (!) 164/64    Less than 140/90 No   Dilated retinal exam : 04/25/2024 Annually Yes    Foot exam   Most Recent Foot Exam Date: Not Found Annually No     Social History[1]  Past Medical History:   Diagnosis Date    Diabetes mellitus     High cholesterol     Kidney stones        Objective:      Physical Exam  Constitutional:       General: He is not in acute distress.     Appearance: He is well-developed. He is not diaphoretic.   HENT:      Head: Normocephalic and atraumatic.      Right Ear: External ear normal.      Left Ear: External ear normal.      Nose: Nose normal.   Eyes:      General:         Right eye: No discharge.         Left eye: No discharge.      Pupils: Pupils are equal, round, and reactive to light.   Cardiovascular:      Rate and Rhythm: Normal rate and regular rhythm.      Pulses:           Dorsalis pedis pulses are 2+ on the right side and 2+ on the left side.        Posterior tibial pulses are 2+ on the right side and 2+ on the left side.      Heart sounds: Normal heart sounds.   Pulmonary:      Effort: Pulmonary effort is normal.      Breath sounds: Normal breath sounds.   Abdominal:      Palpations: Abdomen is soft.   Musculoskeletal:         General: Normal range of motion.      Cervical back: Normal range of motion and neck supple.      Right foot: Normal range of motion. No deformity.      Left foot: Normal range of motion. No deformity.   Feet:      Right foot:      Protective Sensation: 6 sites tested.  6 sites sensed.      Skin integrity: Dry skin present. No ulcer,  "blister, skin breakdown, erythema, warmth or callus.      Left foot:      Protective Sensation: 6 sites tested.  6 sites sensed.      Skin integrity: Dry skin present. No ulcer, blister, skin breakdown, erythema, warmth or callus.   Skin:     General: Skin is warm and dry.      Capillary Refill: Capillary refill takes less than 2 seconds.   Neurological:      Mental Status: He is oriented to person, place, and time.   Psychiatric:         Behavior: Behavior normal.         Thought Content: Thought content normal.         Judgment: Judgment normal.           Assessment / Plan:     Diabetes mellitus type 2, insulin dependent  -     Ambulatory referral/consult to Diabetic Advanced Practice Providers (Medical Management)  -     POCT Glucose, Hand-Held Device  -     Hemoglobin A1C; Future; Expected date: 07/17/2025  -     Comprehensive Metabolic Panel; Future; Expected date: 07/17/2025  -     TSH; Future; Expected date: 07/17/2025  -     blood-glucose sensor (DEXCOM G7 SENSOR) Lucie; 1 each by Misc.(Non-Drug; Combo Route) route every 10 days.  Dispense: 3 each; Refill: 11  -     Glutamic Acid Decarboxylase; Future; Expected date: 07/17/2025  -     C-Peptide; Future; Expected date: 07/17/2025  -     semaglutide (OZEMPIC) 0.25 mg or 0.5 mg(2 mg/1.5 mL) pen injector; Inject 0.25 mg into the skin every 7 days for 28 days, THEN 0.5 mg every 7 days.  Dispense: 1 each; Refill: 11  -     blood-glucose meter kit; To check BG 4 times daily, to use with insurance preferred meter  Dispense: 1 each; Refill: 0  -     lancets Misc; To check BG 4 times daily, to use with insurance preferred meter  Dispense: 100 each; Refill: 11  -     blood sugar diagnostic Strp; To check BG 4 times daily, to use with insurance preferred meter  Dispense: 100 each; Refill: 11  -     pen needle, diabetic (BD ULTRA-FINE PANCHITO PEN NEEDLE) 32 gauge x 5/32" Ndle; 1 each by Misc.(Non-Drug; Combo Route) route 4 (four) times daily with meals and nightly.  " Dispense: 100 each; Refill: 11  -     Ambulatory referral/consult to Nephrology; Future; Expected date: 07/24/2025    CKD stage 3b, GFR 30-44 ml/min    Essential hypertension    Pure hypercholesterolemia    Overweight (BMI 25.0-29.9)      Additional Plan Details:    - POCT Glucose  - Encouraged continuation of lifestyle changes including regular exercise and limiting carbohydrates to 30-45 grams per meal threes times daily and 15 grams per snack with a limit of two daily.   - Encouraged continued monitoring of blood glucose with maintenance of 4 times daily at Fasting, Before Lunch, Before Dinner, and Symptomatic. Dex G7 Rx - will call for training. Insurance preferred Meter and supplies Rx today.   - Current DM Medication Regimen: Change Novolog 16 units before light meals or 26 units before regular meals TID - dose 10 mins before meals. Continue Lantus 50 units at night - reduce to 45 units before bed if able to start ozempic. Start Ozempic 0.25 mg weekly - advised on s/e and monitoring for nausea.   - Will troubleshoot BP meds; will cc to pcp team.   - Health Maintenance standards addressed today: Foot Exam - completed today and documented in physical exam with feedback to patient about proper diabetic foot care and findings, Eye Exam - will be completed within [facility] system and scheduled today, Statin therapy - defer change to high intensity today, A1c to be scheduled, and vaccines counseling.   - [facility] labs sched today  - Referral to Nephrology - Stage 3b CKD  - Referral to CDE - est care same day as Ophtho  - Nursing Visit: Patient is age 79 or younger with an A1c of 7.5 or greater and will not need nursing visit at this time .   - Follow up with me in 6 weeks with A1c prior.      CURRENT DM MEDICATIONS:   Lantus 50 units before bed - change to 45 units before bed the day you start Ozempic   Novolog 16 units before light meals OR 26 units before regular meals TID - take 10 mins before eating; if not eating  skip  Ozempic 0.25 mg weekly - take on Friday in the evening with light meal; let me know if you have nausea; nausea may go away after 1st 2 doses    Blakeney McKnight, PA-C [facility]    A total of 60 minutes was spent in face to face time, of which over 50 % was spent in counseling patient on disease process, complications, treatment, and side effects of medications.                           [1]   Social History  Socioeconomic History    Marital status: Single   Tobacco Use    Smoking status: Never    Smokeless tobacco: Never   Substance and Sexual Activity    Alcohol use: No    Drug use: No     Social Drivers of Health     Financial Resource Strain: Low Risk  (4/4/2024)    [facility]    Overall Financial Resource Strain (CARDIA)     Difficulty of Paying Living Expenses: Not hard at all   Food Insecurity: Food Insecurity Present (5/23/2024)    [facility]    Hunger Vital Sign     Worried About Running Out of Food in the Last Year: Often true     Ran Out of Food in the Last Year: Often true   Transportation Needs: No Transportation Needs (4/4/2024)    [facility]    PRAPARE - Transportation     Lack of Transportation (Medical): No     Lack of Transportation (Non-Medical): No   Physical Activity: Inactive (4/4/2024)    [facility]    Exercise Vital Sign     Days of Exercise per Week: 0 days     Minutes of Exercise per Session: 0 min   Stress: No Stress Concern Present (4/4/2024)    [facility]    [employer] [place] [employer] - Occupational Stress  Questionnaire     Feeling of Stress : Not at all

## 2025-07-17 NOTE — Clinical Note
Blanca staff, please call his pharmacy and troubleshoot amlodipine and lisinopril issue at pharmacy. Off x 1 mo.

## 2025-07-17 NOTE — Clinical Note
Move f/u with me to 8/29 at 1p  Book education visit with [place] at [facility] 60 mins education at 11a on 9/5   Call pt and let him know we switched his 9/5 visit with me to 8/29 and added the 9/5 with [place]. Please mail appt letters

## 2025-07-17 NOTE — LETTER
July 17, 2025      [facility]  [address]  [place] [id number]  Phone: [phone]  Fax: [phone]       Patient: Iftikhar Burns   YOB: 1970  Date of Visit: 07/17/2025    To Whom It May Concern:    Kavya Burns  was at [facility] on 07/17/2025. The patient may return to work/school on 07/18/2025 with no restrictions. If you have any questions or concerns, or if I can be of further assistance, please do not hesitate to contact me.    Sincerely,      ZEHRA Pottre MA

## 2025-07-24 ENCOUNTER — PATIENT OUTREACH (OUTPATIENT)
Dept: ADMINISTRATIVE | Facility: HOSPITAL | Age: 55
End: 2025-07-24
Payer: COMMERCIAL

## 2025-07-24 LAB — GAD65 AB SER-SCNC: 0 NMOL/L

## 2025-07-26 ENCOUNTER — HOSPITAL ENCOUNTER (EMERGENCY)
Facility: HOSPITAL | Age: 55
Discharge: HOME | End: 2025-07-26
Attending: EMERGENCY MEDICINE
Payer: COMMERCIAL

## 2025-07-26 VITALS
HEIGHT: 74 IN | DIASTOLIC BLOOD PRESSURE: 91 MMHG | SYSTOLIC BLOOD PRESSURE: 165 MMHG | RESPIRATION RATE: 19 BRPM | TEMPERATURE: 98 F | OXYGEN SATURATION: 98 % | WEIGHT: 216.81 LBS | BODY MASS INDEX: 27.82 KG/M2 | HEART RATE: 87 BPM

## 2025-07-26 DIAGNOSIS — N13.30 HYDRONEPHROSIS OF RIGHT KIDNEY: ICD-10-CM

## 2025-07-26 DIAGNOSIS — N23 RENAL COLIC ON RIGHT SIDE: Primary | ICD-10-CM

## 2025-07-26 DIAGNOSIS — N20.0 RENAL CALCULI: ICD-10-CM

## 2025-07-26 DIAGNOSIS — R10.9 RIGHT FLANK PAIN: ICD-10-CM

## 2025-07-26 LAB
ABSOLUTE EOSINOPHIL (OHS): 0.11 K/UL
ABSOLUTE MONOCYTE (OHS): 0.63 K/UL (ref 0.3–1)
ABSOLUTE NEUTROPHIL COUNT (OHS): 6.18 K/UL (ref 1.8–7.7)
ALBUMIN SERPL BCP-MCNC: 4.8 G/DL (ref 3.5–5.2)
ALP SERPL-CCNC: 126 UNIT/L (ref 40–150)
ALT SERPL W/O P-5'-P-CCNC: 29 UNIT/L (ref 10–44)
ANION GAP (OHS): 12 MMOL/L (ref 8–16)
AST SERPL-CCNC: 20 UNIT/L (ref 11–45)
BASOPHILS # BLD AUTO: 0.04 K/UL
BASOPHILS NFR BLD AUTO: 0.4 %
BILIRUB SERPL-MCNC: 0.3 MG/DL (ref 0.1–1)
BUN SERPL-MCNC: 17 MG/DL (ref 6–20)
CALCIUM SERPL-MCNC: 10 MG/DL (ref 8.7–10.5)
CHLORIDE SERPL-SCNC: 102 MMOL/L (ref 95–110)
CO2 SERPL-SCNC: 23 MMOL/L (ref 23–29)
CREAT SERPL-MCNC: 1.7 MG/DL (ref 0.5–1.4)
ERYTHROCYTE [DISTWIDTH] IN BLOOD BY AUTOMATED COUNT: 16.4 % (ref 11.5–14.5)
GFR SERPLBLD CREATININE-BSD FMLA CKD-EPI: 47 ML/MIN/1.73/M2
GLUCOSE SERPL-MCNC: 338 MG/DL (ref 70–110)
HCT VFR BLD AUTO: 45.8 % (ref 40–54)
HGB BLD-MCNC: 14.3 GM/DL (ref 14–18)
IMM GRANULOCYTES # BLD AUTO: 0.02 K/UL (ref 0–0.04)
IMM GRANULOCYTES NFR BLD AUTO: 0.2 % (ref 0–0.5)
LIPASE SERPL-CCNC: 39 U/L (ref 4–60)
LYMPHOCYTES # BLD AUTO: 2.84 K/UL (ref 1–4.8)
MCH RBC QN AUTO: 24.9 PG (ref 27–31)
MCHC RBC AUTO-ENTMCNC: 31.2 G/DL (ref 32–36)
MCV RBC AUTO: 80 FL (ref 82–98)
NUCLEATED RBC (/100WBC) (OHS): 0 /100 WBC
PLATELET # BLD AUTO: 251 K/UL (ref 150–450)
PMV BLD AUTO: 9.8 FL (ref 9.2–12.9)
POTASSIUM SERPL-SCNC: 3.7 MMOL/L (ref 3.5–5.1)
PROT SERPL-MCNC: 8.8 GM/DL (ref 6–8.4)
RBC # BLD AUTO: 5.74 M/UL (ref 4.6–6.2)
RELATIVE EOSINOPHIL (OHS): 1.1 %
RELATIVE LYMPHOCYTE (OHS): 28.9 % (ref 18–48)
RELATIVE MONOCYTE (OHS): 6.4 % (ref 4–15)
RELATIVE NEUTROPHIL (OHS): 63 % (ref 38–73)
SODIUM SERPL-SCNC: 137 MMOL/L (ref 136–145)
WBC # BLD AUTO: 9.82 K/UL (ref 3.9–12.7)

## 2025-07-26 PROCEDURE — 63600175 PHARM REV CODE 636 W HCPCS: Mod: JZ,TB,ER | Performed by: EMERGENCY MEDICINE

## 2025-07-26 PROCEDURE — 85025 COMPLETE CBC W/AUTO DIFF WBC: CPT | Mod: ER | Performed by: EMERGENCY MEDICINE

## 2025-07-26 PROCEDURE — 96375 TX/PRO/DX INJ NEW DRUG ADDON: CPT | Mod: ER

## 2025-07-26 PROCEDURE — 96374 THER/PROPH/DIAG INJ IV PUSH: CPT | Mod: ER

## 2025-07-26 PROCEDURE — 82374 ASSAY BLOOD CARBON DIOXIDE: CPT | Mod: ER | Performed by: EMERGENCY MEDICINE

## 2025-07-26 PROCEDURE — 25000003 PHARM REV CODE 250: Mod: ER | Performed by: EMERGENCY MEDICINE

## 2025-07-26 PROCEDURE — 83690 ASSAY OF LIPASE: CPT | Mod: ER | Performed by: EMERGENCY MEDICINE

## 2025-07-26 PROCEDURE — 96361 HYDRATE IV INFUSION ADD-ON: CPT | Mod: ER

## 2025-07-26 PROCEDURE — 99285 EMERGENCY DEPT VISIT HI MDM: CPT | Mod: 25,ER

## 2025-07-26 RX ORDER — TAMSULOSIN HYDROCHLORIDE 0.4 MG/1
0.4 CAPSULE ORAL DAILY
Qty: 30 CAPSULE | Refills: 0 | Status: SHIPPED | OUTPATIENT
Start: 2025-07-26 | End: 2026-07-26

## 2025-07-26 RX ORDER — ONDANSETRON 4 MG/1
4 TABLET, ORALLY DISINTEGRATING ORAL EVERY 6 HOURS PRN
Qty: 30 TABLET | Refills: 0 | Status: SHIPPED | OUTPATIENT
Start: 2025-07-26

## 2025-07-26 RX ORDER — HYDROCODONE BITARTRATE AND ACETAMINOPHEN 5; 325 MG/1; MG/1
1 TABLET ORAL EVERY 4 HOURS PRN
Qty: 30 TABLET | Refills: 0 | Status: SHIPPED | OUTPATIENT
Start: 2025-07-26

## 2025-07-26 RX ORDER — KETOROLAC TROMETHAMINE 30 MG/ML
30 INJECTION, SOLUTION INTRAMUSCULAR; INTRAVENOUS
Status: COMPLETED | OUTPATIENT
Start: 2025-07-26 | End: 2025-07-26

## 2025-07-26 RX ORDER — ONDANSETRON HYDROCHLORIDE 2 MG/ML
4 INJECTION, SOLUTION INTRAVENOUS
Status: COMPLETED | OUTPATIENT
Start: 2025-07-26 | End: 2025-07-26

## 2025-07-26 RX ADMIN — KETOROLAC TROMETHAMINE 30 MG: 30 INJECTION, SOLUTION INTRAMUSCULAR at 02:07

## 2025-07-26 RX ADMIN — SODIUM CHLORIDE 1000 ML: 9 INJECTION, SOLUTION INTRAVENOUS at 02:07

## 2025-07-26 RX ADMIN — ONDANSETRON 4 MG: 2 INJECTION INTRAMUSCULAR; INTRAVENOUS at 02:07

## 2025-07-26 NOTE — ED PROVIDER NOTES
Encounter Date: 7/26/2025       History     Chief Complaint   Patient presents with    Flank Pain     Right sided flank pain that started 2 hours ago; hx of kidney stones in the past      The history is provided by the patient.   Abdominal Pain  The current episode started today. The onset of the illness was gradual. The abdominal pain is located in the RUQ, RLQ and right flank. Pain scale: moderate. The abdominal pain is relieved by nothing. The other symptoms of the illness include nausea and vomiting (vomited while here in ER). The other symptoms of the illness do not include fever, fatigue, jaundice, melena, shortness of breath, diarrhea, dysuria, hematemesis or hematochezia.   Nausea began today.   The vomiting began today. Vomiting occurred once. The emesis contains stomach contents.   The patient has not had a change in bowel habit. Additional symptoms associated with the illness include back pain (right flank pain). Symptoms associated with the illness do not include chills, anorexia, diaphoresis, heartburn, constipation, urgency, hematuria or frequency.     Review of patient's allergies indicates:  No Known Allergies  Past Medical History:   Diagnosis Date    Diabetes mellitus     High cholesterol     Kidney stones      Past Surgical History:   Procedure Laterality Date    NO PAST SURGERIES       Family History   Problem Relation Name Age of Onset    Diabetes Mother      Cancer Father       Social History[1]  Review of Systems   Constitutional:  Negative for chills, diaphoresis, fatigue and fever.   HENT:  Negative for sore throat.    Respiratory:  Negative for shortness of breath.    Cardiovascular:  Negative for chest pain.   Gastrointestinal:  Positive for abdominal pain, nausea and vomiting (vomited while here in ER). Negative for anorexia, constipation, diarrhea, heartburn, hematemesis, hematochezia, jaundice and melena.   Genitourinary:  Negative for dysuria, frequency, hematuria and urgency.    Musculoskeletal:  Positive for back pain (right flank pain).   Skin:  Negative for rash.   Neurological:  Negative for weakness.   Hematological:  Does not bruise/bleed easily.   All other systems reviewed and are negative.      Physical Exam     Initial Vitals [07/26/25 1348]   BP Pulse Resp Temp SpO2   (!) 156/79 82 19 98.4 °F (36.9 °C) 99 %      MAP       --         Physical Exam    Nursing note and vitals reviewed.  Constitutional: He appears well-developed and well-nourished. He is not diaphoretic. No distress.   HENT:   Head: Normocephalic and atraumatic.   Eyes: EOM are normal. Pupils are equal, round, and reactive to light. No scleral icterus.   Neck: Neck supple. No thyromegaly present.   Normal range of motion.  Cardiovascular:  Normal rate, regular rhythm, normal heart sounds and intact distal pulses.     Exam reveals no gallop and no friction rub.       No murmur heard.  Pulmonary/Chest: Breath sounds normal. No respiratory distress. He has no wheezes. He has no rhonchi. He exhibits no tenderness.   Abdominal: Abdomen is soft. Bowel sounds are normal. He exhibits no distension. There is abdominal tenderness in the right upper quadrant and right lower quadrant. No hernia.   There is right CVA tenderness.  No left CVA tenderness. There is no rebound and no guarding.   Musculoskeletal:         General: No tenderness or edema. Normal range of motion.      Cervical back: Normal range of motion and neck supple.     Lymphadenopathy:     He has no cervical adenopathy.   Neurological: He is alert and oriented to person, place, and time. He has normal strength. No cranial nerve deficit or sensory deficit. GCS score is 15. GCS eye subscore is 4. GCS verbal subscore is 5. GCS motor subscore is 6.   Skin: Skin is warm and dry. Capillary refill takes less than 2 seconds.   Psychiatric: He has a normal mood and affect. His behavior is normal. Judgment and thought content normal.         ED Course   Procedures  Labs  Reviewed   COMPREHENSIVE METABOLIC PANEL - Abnormal       Result Value    Sodium 137      Potassium 3.7      Chloride 102      CO2 23      Glucose 338 (*)     BUN 17      Creatinine 1.7 (*)     Calcium 10.0      Protein Total 8.8 (*)     Albumin 4.8      Bilirubin Total 0.3            AST 20      ALT 29      Anion Gap 12      eGFR 47 (*)    CBC WITH DIFFERENTIAL - Abnormal    WBC 9.82      RBC 5.74      HGB 14.3      HCT 45.8      MCV 80 (*)     MCH 24.9 (*)     MCHC 31.2 (*)     RDW 16.4 (*)     Platelet Count 251      MPV 9.8      Nucleated RBC 0      Neut % 63.0      Lymph % 28.9      Mono % 6.4      Eos % 1.1      Basophil % 0.4      Imm Grans % 0.2      Neut # 6.18      Lymph # 2.84      Mono # 0.63      Eos # 0.11      Baso # 0.04      Imm Grans # 0.02     LIPASE - Normal    Lipase Level 39     CBC W/ AUTO DIFFERENTIAL    Narrative:     The following orders were created for panel order CBC W/ AUTO DIFFERENTIAL.  Procedure                               Abnormality         Status                     ---------                               -----------         ------                     CBC with Differential[[id number]]       Abnormal            Final result                 Please view results for these tests on the individual orders.   URINALYSIS, REFLEX TO URINE CULTURE          Imaging Results              CT Renal Stone Study ABD Pelvis WO (Final result)  Result time 07/26/25 15:01:21      Final result by Roberto Sawant MD (07/26/25 15:01:21)                   Impression:     Persistent right hydroureteronephrosis with UVJ/bladder wall stone.  Left nephrolithiasis.    Finalized on: 7/26/2025 3:01 PM By:  Roberto Sawant MD  [facility]# [id number]      2025-07-26 15:03:31.047     [facility]               Narrative:    EXAM:  CT RENAL STONE STUDY ABD PELVIS WO    CLINICAL HISTORY:  Nephrolithiasis.    COMPARISON: 05/24/2025.    TECHNIQUE:  Limited noncontrast CT scan of the abdomen and pelvis.  All CT scans  at [this location] are performed using dose modulation techniques as appropriate to a performed exam including the following: automated exposure control; adjustment of the mA and/or kV according to patient size (this includes techniques or standardized protocols for targeted exams where dose is matched to indication / reason for exam; i.e. extremities or head); use of iterative reconstruction technique.    FINDINGS:  Mild atelectasis is noted in the lung bases.    The liver is mildly enlarged with low attenuation consistent with fatty infiltration.    The gallbladder is present.    Again there is right hydroureteronephrosis with a right UVJ or bladder wall/lumen stone seen best on image 171 of series 2 measuring 6.5 mm in size.    The left kidney again demonstrates a nonobstructing stone of the lower pole measuring 11.4 mm in size.    The pancreas is normal.    The bowel loops are normal.    No free fluid.    The aorta and IVC appear grossly normal.    Images of the pelvis are unremarkable.                                         Medications   sodium chloride 0.9% bolus 1,000 mL 1,000 mL (0 mLs Intravenous Stopped 7/26/25 1500)   ondansetron injection 4 mg (4 mg Intravenous Given 7/26/25 1400)   ketorolac injection 30 mg (30 mg Intravenous Given 7/26/25 1400)     Medical Decision Making  Amount and/or Complexity of Data Reviewed  Labs: ordered. Decision-making details documented in ED Course.  Radiology: ordered. Decision-making details documented in ED Course.    Risk  OTC drugs.  Prescription drug management.  Parenteral controlled substances.  Risk Details: OTC drugs, prescription drugs and controlled substances considered.  Due to patient's symptoms improving and pain controlled pain medications ordered appropriately.  Differential diagnosis;  Gastroenteritis, Bowel obstruction, Colitis, Diverticulitis, Cholecystitis, Appendicitis, Perforated bowel, Herniation, Infectious etiology, UTI, Pyelonephritis,  Biliary  "obstruction, kidney stone among others.                        Vitals:    07/26/25 1348 07/26/25 1408 07/26/25 1418 07/26/25 1428   BP: (!) 156/79 (!) 145/103 (!) 164/97 (!) 166/98   Pulse: 82 98 83 68   Resp: 19      Temp: 98.4 °F (36.9 °C)      TempSrc: Oral      SpO2: 99% 99% 99% 97%   Weight: 98.4 kg (216 lb 13.2 oz)      Height: 6' 2" (1.88 m)       07/26/25 1641   BP: (!) 165/91   Pulse: 87   Resp:    Temp:    TempSrc:    SpO2: 98%   Weight:    Height:        Results for orders placed or performed during the hospital encounter of 07/26/25   Comp. Metabolic Panel    Collection Time: 07/26/25  1:56 PM   Result Value Ref Range    Sodium 137 136 - 145 mmol/L    Potassium 3.7 3.5 - 5.1 mmol/L    Chloride 102 95 - 110 mmol/L    CO2 23 23 - 29 mmol/L    Glucose 338 (H) 70 - 110 mg/dL    BUN 17 6 - 20 mg/dL    Creatinine 1.7 (H) 0.5 - 1.4 mg/dL    Calcium 10.0 8.7 - 10.5 mg/dL    Protein Total 8.8 (H) 6.0 - 8.4 gm/dL    Albumin 4.8 3.5 - 5.2 g/dL    Bilirubin Total 0.3 0.1 - 1.0 mg/dL     40 - 150 unit/L    AST 20 11 - 45 unit/L    ALT 29 10 - 44 unit/L    Anion Gap 12 8 - 16 mmol/L    eGFR 47 (L) >60 mL/min/1.73/m2   Lipase    Collection Time: 07/26/25  1:56 PM   Result Value Ref Range    Lipase Level 39 4 - 60 U/L   CBC with Differential    Collection Time: 07/26/25  1:56 PM   Result Value Ref Range    WBC 9.82 3.90 - 12.70 K/uL    RBC 5.74 4.60 - 6.20 M/uL    HGB 14.3 14.0 - 18.0 gm/dL    HCT 45.8 40.0 - 54.0 %    MCV 80 (L) 82 - 98 fL    MCH 24.9 (L) 27.0 - 31.0 pg    MCHC 31.2 (L) 32.0 - 36.0 g/dL    RDW 16.4 (H) 11.5 - 14.5 %    Platelet Count 251 150 - 450 K/uL    MPV 9.8 9.2 - 12.9 fL    Nucleated RBC 0 <=0 /100 WBC    Neut % 63.0 38 - 73 %    Lymph % 28.9 18 - 48 %    Mono % 6.4 4 - 15 %    Eos % 1.1 <=8 %    Basophil % 0.4 <=1.9 %    Imm Grans % 0.2 0.0 - 0.5 %    Neut # 6.18 1.8 - 7.7 K/uL    Lymph # 2.84 1 - 4.8 K/uL    Mono # 0.63 0.3 - 1 K/uL    Eos # 0.11 <=0.5 K/uL    Baso # 0.04 <=0.2 K/uL    " [facility] Grans # 0.02 0.00 - 0.04 K/uL         Imaging Results              CT Renal Stone Study ABD Pelvis WO (Final result)  Result time 07/26/25 15:01:21      Final result by Roberto Sawant MD (07/26/25 15:01:21)                   Impression:     Persistent right hydroureteronephrosis with UVJ/bladder wall stone.  Left nephrolithiasis.    Finalized on: 7/26/2025 3:01 PM By:  Roberto Sawant MD  [facility]# [id number]      2025-07-26 15:03:31.047     [facility]               Narrative:    EXAM:  CT RENAL STONE STUDY ABD PELVIS WO    CLINICAL HISTORY:  Nephrolithiasis.    COMPARISON: 05/24/2025.    TECHNIQUE:  Limited noncontrast CT scan of the abdomen and pelvis.  All CT scans at [this location] are performed using dose modulation techniques as appropriate to a performed exam including the following: automated exposure control; adjustment of the mA and/or kV according to patient size (this includes techniques or standardized protocols for targeted exams where dose is matched to indication / reason for exam; i.e. extremities or head); use of iterative reconstruction technique.    FINDINGS:  Mild atelectasis is noted in the lung bases.    The liver is mildly enlarged with low attenuation consistent with fatty infiltration.    The gallbladder is present.    Again there is right hydroureteronephrosis with a right UVJ or bladder wall/lumen stone seen best on image 171 of series 2 measuring 6.5 mm in size.    The left kidney again demonstrates a nonobstructing stone of the lower pole measuring 11.4 mm in size.    The pancreas is normal.    The bowel loops are normal.    No free fluid.    The aorta and IVC appear grossly normal.    Images of the pelvis are unremarkable.                                        Medications   sodium chloride 0.9% bolus 1,000 mL 1,000 mL (0 mLs Intravenous Stopped 7/26/25 1500)   ondansetron injection 4 mg (4 mg Intravenous Given 7/26/25 1400)   ketorolac injection 30 mg (30 mg Intravenous Given  7/26/25 1400)         2:54 PM  - Re-evaluation:  The patient is resting comfortably and is in no acute distress. Discussed test results and notified of pending labs. Answered questions at this time.     4:28 PM - Re-evaluation: The patient is resting comfortably and is in no acute distress. He states that his symptoms have improved after treatment within ER. Discussed test results, shared treatment plan, specific conditions for return, and importance of follow up with patient and family.  Advised close f/u c pcp/urology within one week and to return to ER if any issues arise.  He understands and agrees with the plan as discussed. Answered  his questions at this time. He has remained hemodynamically stable throughout the ED course and is appropriate for discharge home.     Regarding KIDNEY STONES, I instructed patient to: drink as much water as possible to increase urination and the possibility of passing any stone fragments ; return to normal activity unless taking pain medications as narcotics may affect ones judgment or reflexes;  call primary care provider or urologist if still experiencing pain; collect stone or fragment and bring it to primary care provider or urologist so it can be analyzed; continue taking routine medications unless advised differently; and to contact primary care provider or urologist for any questions or concerns regarding the condition or treatment plan.  Patient was educated on etiology of kidney stones (i.e., high concentrations of calcium, oxalate, and phosphorus) and the different types of kidney stones (i.e., calcium stones, uric acid stones, struvite stones, and cystine stones).  For prevention of kidney stones, reiterated to patient that drinking enough fluid is the most important thing a person can do to prevent kidney stones and that one should drink enough water and other fluids to make at least 2 liters of urine a day.  I also recommended that patient reduce sodium intake to  reduce calcium excretion via urinary system and  limit intake of purines (i.e., meats and animal proteins).     Driving or other activities under influence of medications - Patient and/or family/caretaker was given a prescription for, or instructed to use a medicine that may impair ability to drive, operate machinery, or participate in other potentially dangerous activities.  Patient was instructed not to participate in these activities while under the influence of these medications.    For NAUSEA/VOMITING, I advised patient on importance of staying well hydrated; eating frequent, small amounts of clear liquids; avoid solid foods until there has been no vomiting for six hours, and then work slowly back to a normal diet; and to use an OTC bismuth stomach remedy for upset stomach, nausea, indigestion, and diarrhea. We discussed signs and symptoms of dehydration and possible causes of N/V with patient (viral infections, medications, migraine headaches, food poisoning, allergies, and peptic ulcer disease).  Reiterated the importance of following up with primary care provider if no improvement is noted within 72 hours.     Pre-hypertension/Hypertension: The pt has been informed that they may have pre-hypertension or hypertension based on a blood pressure reading in the ED. I recommend that the pt call the PCP listed on their discharge instructions or a physician of their choice this week to arrange f/u for further evaluation of possible pre-hypertension or hypertension.     Iftikharmary Burns was given a handout which discussed their disease process, precautions, and instructions for follow-up and therapy.     Follow-up Information       [facility]. Schedule an appointment as soon as possible for a visit in 1 week.    Specialty: Urology  Contact information:  [address] [facility] [address] [phone]  Additional information:  Please park on the [address] side and use the  Clinic entrance. Check in on the 3rd floor, to the right.             Gómez Cao MD. Schedule an appointment as soon as possible for a visit in 1 week.    Specialties: Internal Medicine, Family Medicine  Contact information:  [address] [address] [address]  [phone]               [facility] Emergency Dept.    Specialty: Emergency Medicine  Why: As needed, If symptoms worsen  Contact information:  [address] [facility] [address]  Emergency Department  [facility] [address] [phone]                              Medication List        START taking these medications      ondansetron 4 MG Tbdl  Commonly known as: ZOFRAN-ODT  Take 1 tablet (4 mg total) by mouth every 6 (six) hours as needed.            CHANGE how you take these medications      HYDROcodone-acetaminophen 5-325 mg per tablet  Commonly known as: NORCO  Take 1 tablet by mouth every 4 (four) hours as needed for Pain.  What changed:   when to take this  reasons to take this     * tamsulosin 0.4 mg Cap  Commonly known as: FLOMAX  Take 1 capsule (0.4 mg total) by mouth once daily.  What changed: Another medication with the same name was added. Make sure you understand how and when to take each.     * tamsulosin 0.4 mg Cap  Commonly known as: FLOMAX  Take 1 capsule (0.4 mg total) by mouth once daily.  What changed: You were already taking a medication with the same name, and this prescription was added. Make sure you understand how and when to take each.           * This list has 2 medication(s) that are the same as other medications prescribed for you. Read the directions carefully, and ask your doctor or other care provider to review them with you.                ASK your doctor about these medications      amLODIPine 10 MG tablet  Commonly known as: NORVASC  Take 1 tablet (10 mg total) by mouth once daily.     blood sugar diagnostic Strp  To check BG 4 times daily, to use with insurance preferred meter     blood-glucose meter kit  To check BG 4  "times daily, to use with insurance preferred meter     DEXCOM G7 SENSOR Lucie  Generic drug: blood-glucose sensor  1 each by Misc.(Non-Drug; Combo Route) route every 10 days.     insulin aspart U-100 100 unit/mL (3 mL) Inpn pen  Commonly known as: NovoLOG     lancets Misc  To check BG 4 times daily, to use with insurance preferred meter     LANTUS SOLOSTAR U-100 INSULIN 100 unit/mL (3 mL) Inpn pen  Generic drug: insulin glargine U-100 (Lantus)     lisinopriL 40 MG tablet  Commonly known as: PRINIVIL,ZESTRIL  Take 1 tablet (40 mg total) by mouth once daily.     OZEMPIC 0.25 mg or 0.5 mg(2 mg/1.5 mL) pen injector  Generic drug: semaglutide  Inject 0.25 mg into the skin every 7 days for 28 days, THEN 0.5 mg every 7 days.  Start taking on: July 17, 2025     pen needle, diabetic 32 gauge x 5/32" Ndle  Commonly known as: BD ULTRA-FINE PANCHITO PEN NEEDLE  1 each by Misc.(Non-Drug; Combo Route) route 4 (four) times daily with meals and nightly.     pravastatin 20 MG tablet  Commonly known as: PRAVACHOL  Take 1 tablet (20 mg total) by mouth once daily.               Where to Get Your Medications        You can get these medications from any pharmacy    Bring a paper prescription for each of these medications  HYDROcodone-acetaminophen 5-325 mg per tablet  ondansetron 4 MG Tbdl  tamsulosin 0.4 mg Cap        Current Discharge Medication List            ED Diagnosis  1. Renal colic on right side    2. Right flank pain    3. Renal calculi    4. Hydronephrosis of right kidney                            Clinical Impression:  Final diagnoses:  [R10.9] Right flank pain  [N23] Renal colic on right side (Primary)  [N20.0] Renal calculi  [N13.30] Hydronephrosis of right kidney          ED Disposition Condition    Discharge Stable          ED Prescriptions       Medication Sig Dispense Start Date End Date Auth. Provider    tamsulosin (FLOMAX) 0.4 mg Cap Take 1 capsule (0.4 mg total) by mouth once daily. 30 capsule 7/26/2025 7/26/2026 " Pato Delong Jr., MD    ondansetron (ZOFRAN-ODT) 4 MG TbDL Take 1 tablet (4 mg total) by mouth every 6 (six) hours as needed. 30 tablet 7/26/2025 -- Pato Delong Jr., MD    HYDROcodone-acetaminophen (NORCO) 5-325 mg per tablet Take 1 tablet by mouth every 4 (four) hours as needed for Pain. 30 tablet 7/26/2025 -- Pato Delong Jr., MD          Follow-up Information       Follow up With Specialties Details Why Contact Info Additional Information    [facility] Schedule an appointment as soon as possible for a visit in 1 week  [id number] [facility] [id number] [phone] Please park on the [address] side and use the Clinic entrance. Check in on the 3rd floor, to the right.    Gómez Cao MD Internal Medicine, Family Medicine Schedule an appointment as soon as possible for a visit in 1 week  [id number] [place] [address] [id number] [phone]       [facility] - Emergency Dept Emergency Medicine  As needed, If symptoms worsen [address] [place] [address]  Emergency Department  [facility] [address] [phone]                    [1]   Social History  Tobacco Use    Smoking status: Never    Smokeless tobacco: Never   Substance Use Topics    Alcohol use: No    Drug use: No        Pato Delong Jr., MD  07/26/25 2552

## 2025-07-26 NOTE — DISCHARGE INSTRUCTIONS
Regarding KIDNEY STONES, I instructed patient to: drink as much water as possible to increase urination and the possibility of passing any stone fragments ; return to normal activity unless taking pain medications as narcotics may affect ones judgment or reflexes;  call primary care provider or urologist if still experiencing pain; collect stone or fragment and bring it to primary care provider or urologist so it can be analyzed; continue taking routine medications unless advised differently; and to contact primary care provider or urologist for any questions or concerns regarding the condition or treatment plan.  Patient was educated on etiology of kidney stones (i.e., high concentrations of calcium, oxalate, and phosphorus) and the different types of kidney stones (i.e., calcium stones, uric acid stones, struvite stones, and cystine stones).  For prevention of kidney stones, reiterated to patient that drinking enough fluid is the most important thing a person can do to prevent kidney stones and that one should drink enough water and other fluids to make at least 2 liters of urine a day.  I also recommended that patient reduce sodium intake to reduce calcium excretion via urinary system and  limit intake of purines (i.e., meats and animal proteins).     Driving or other activities under influence of medications - Patient and/or family/caretaker was given a prescription for, or instructed to use a medicine that may impair ability to drive, operate machinery, or participate in other potentially dangerous activities.  Patient was instructed not to participate in these activities while under the influence of these medications.    For NAUSEA/VOMITING, I advised patient on importance of staying well hydrated; eating frequent, small amounts of clear liquids; avoid solid foods until there has been no vomiting for six hours, and then work slowly back to a normal diet; and to use an OTC bismuth stomach remedy for upset  stomach, nausea, indigestion, and diarrhea. We discussed signs and symptoms of dehydration and possible causes of N/V with patient (viral infections, medications, migraine headaches, food poisoning, allergies, and peptic ulcer disease).  Reiterated the importance of following up with primary care provider if no improvement is noted within 72 hours.

## 2025-08-05 ENCOUNTER — TELEPHONE (OUTPATIENT)
Dept: DIABETES | Facility: CLINIC | Age: 55
End: 2025-08-05
Payer: COMMERCIAL

## 2025-08-05 NOTE — TELEPHONE ENCOUNTER
Spoke with pt brother informed pt brother     Tell him A1c is still elevated and insulin level came back low. Kidneys looked better. Tsh normal. Let him know I will review in person with him     Pt brother verbally understood and will relay labs over to pt . Informed pts brother to tell him to give us a call if he have any further questions

## 2025-08-05 NOTE — TELEPHONE ENCOUNTER
----- Message from Jamey Madrigal PA-C sent at 8/5/2025  3:24 PM CDT -----  Tell him A1c is still elevated and insulin level came back low. Kidneys looked better. Tsh normal. Let him know I will review in person with him     ----- Message -----  From: Reese Garcia MA  Sent: 8/5/2025   3:20 PM CDT  To: Jamey Madrigal PA-C    Could you please provide resulting note to give patient  ----- Message -----  From: Jamey Madrigal PA-C  Sent: 8/5/2025   3:17 PM CDT  To: Kaitlin Concepcion Staff    Please give patient lab results and let patient know that we will review labs in further detail at upcoming appt.     Jamey Madrigal PA-C  Diabetes Management       ----- Message -----  From: Lab, Background User  Sent: 7/17/2025  11:04 AM CDT  To: Jamey Madrigal PA-C

## 2025-09-05 ENCOUNTER — OFFICE VISIT (OUTPATIENT)
Dept: OPHTHALMOLOGY | Facility: CLINIC | Age: 55
End: 2025-09-05
Payer: COMMERCIAL

## 2025-09-05 ENCOUNTER — CLINICAL SUPPORT (OUTPATIENT)
Dept: DIABETES | Facility: CLINIC | Age: 55
End: 2025-09-05
Payer: COMMERCIAL

## 2025-09-05 VITALS — WEIGHT: 208.75 LBS | HEIGHT: 74 IN | BODY MASS INDEX: 26.79 KG/M2

## 2025-09-05 DIAGNOSIS — H52.203 MYOPIA WITH ASTIGMATISM AND PRESBYOPIA, BILATERAL: ICD-10-CM

## 2025-09-05 DIAGNOSIS — E11.9 DIABETES MELLITUS TYPE 2, INSULIN DEPENDENT: Primary | ICD-10-CM

## 2025-09-05 DIAGNOSIS — H52.4 MYOPIA WITH ASTIGMATISM AND PRESBYOPIA, BILATERAL: ICD-10-CM

## 2025-09-05 DIAGNOSIS — H25.13 NUCLEAR SCLEROSIS OF BOTH EYES: ICD-10-CM

## 2025-09-05 DIAGNOSIS — E11.9 DIABETES MELLITUS WITHOUT COMPLICATION: Primary | ICD-10-CM

## 2025-09-05 DIAGNOSIS — H52.13 MYOPIA WITH ASTIGMATISM AND PRESBYOPIA, BILATERAL: ICD-10-CM

## 2025-09-05 DIAGNOSIS — Z79.4 DIABETES MELLITUS TYPE 2, INSULIN DEPENDENT: Primary | ICD-10-CM

## 2025-09-05 PROCEDURE — 99999 PR PBB SHADOW E&M-EST. PATIENT-LVL III: CPT | Mod: PBBFAC,,, | Performed by: OPTOMETRIST

## 2025-09-05 PROCEDURE — 99999 PR PBB SHADOW E&M-EST. PATIENT-LVL III: CPT | Mod: PBBFAC,,, | Performed by: DIETITIAN, REGISTERED
